# Patient Record
Sex: FEMALE | Race: WHITE | Employment: UNEMPLOYED
[De-identification: names, ages, dates, MRNs, and addresses within clinical notes are randomized per-mention and may not be internally consistent; named-entity substitution may affect disease eponyms.]

---

## 2017-01-10 LAB
BILIRUBIN, URINE: NEGATIVE
BLOOD, URINE: NEGATIVE
CLARITY: ABNORMAL
COLOR: YELLOW
GLUCOSE URINE: ABNORMAL
KETONES, URINE: NEGATIVE
LEUKOCYTE ESTERASE, URINE: NEGATIVE
NITRITE, URINE: NEGATIVE
PH UA: 7.5 (ref 4.5–8)
PROTEIN UA: NEGATIVE
SPECIFIC GRAVITY, URINE: 1.02
UROBILINOGEN, URINE: NORMAL

## 2017-02-03 ENCOUNTER — OFFICE VISIT (OUTPATIENT)
Dept: FAMILY MEDICINE CLINIC | Facility: CLINIC | Age: 47
End: 2017-02-03

## 2017-02-03 VITALS
SYSTOLIC BLOOD PRESSURE: 110 MMHG | HEIGHT: 63 IN | WEIGHT: 150 LBS | HEART RATE: 78 BPM | BODY MASS INDEX: 26.58 KG/M2 | RESPIRATION RATE: 16 BRPM | DIASTOLIC BLOOD PRESSURE: 71 MMHG

## 2017-02-03 DIAGNOSIS — Z13.220 SCREENING CHOLESTEROL LEVEL: ICD-10-CM

## 2017-02-03 DIAGNOSIS — R55 NEAR SYNCOPE: Primary | ICD-10-CM

## 2017-02-03 DIAGNOSIS — R20.2 PARESTHESIA: ICD-10-CM

## 2017-02-03 DIAGNOSIS — F41.9 ANXIETY: ICD-10-CM

## 2017-02-03 DIAGNOSIS — Z23 NEED FOR DTAP VACCINATION: ICD-10-CM

## 2017-02-03 PROCEDURE — G8427 DOCREV CUR MEDS BY ELIG CLIN: HCPCS | Performed by: FAMILY MEDICINE

## 2017-02-03 PROCEDURE — 90471 IMMUNIZATION ADMIN: CPT | Performed by: FAMILY MEDICINE

## 2017-02-03 PROCEDURE — 90715 TDAP VACCINE 7 YRS/> IM: CPT | Performed by: FAMILY MEDICINE

## 2017-02-03 PROCEDURE — G8484 FLU IMMUNIZE NO ADMIN: HCPCS | Performed by: FAMILY MEDICINE

## 2017-02-03 PROCEDURE — G8419 CALC BMI OUT NRM PARAM NOF/U: HCPCS | Performed by: FAMILY MEDICINE

## 2017-02-03 PROCEDURE — 99214 OFFICE O/P EST MOD 30 MIN: CPT | Performed by: FAMILY MEDICINE

## 2017-02-03 PROCEDURE — 1036F TOBACCO NON-USER: CPT | Performed by: FAMILY MEDICINE

## 2017-02-03 RX ORDER — DULOXETIN HYDROCHLORIDE 20 MG/1
20 CAPSULE, DELAYED RELEASE ORAL DAILY
Qty: 30 CAPSULE | Refills: 1 | Status: SHIPPED | OUTPATIENT
Start: 2017-02-03 | End: 2017-02-27 | Stop reason: SDUPTHER

## 2017-02-03 RX ORDER — FLUTICASONE PROPIONATE 50 MCG
1 SPRAY, SUSPENSION (ML) NASAL DAILY
COMMUNITY

## 2017-02-07 LAB
ABSOLUTE BASO #: 0.1 K/UL (ref 0–0.1)
ABSOLUTE EOS #: 0.1 K/UL (ref 0.1–0.4)
ABSOLUTE LYMPH #: 1.5 K/UL (ref 0.8–5.2)
ABSOLUTE MONO #: 0.4 K/UL (ref 0.1–0.9)
ABSOLUTE NEUT #: 4.8 K/UL (ref 1.3–9.1)
ANION GAP SERPL CALCULATED.3IONS-SCNC: 12 MMOL/L
BASOPHILS RELATIVE PERCENT: 1.2 % (ref 0–1)
BUN BLDV-MCNC: 10 MG/DL (ref 10–20)
CALCIUM SERPL-MCNC: 9.6 MG/DL (ref 8.7–10.8)
CHLORIDE BLD-SCNC: 102 MMOL/L (ref 95–111)
CHOLESTEROL/HDL RATIO: 4.5
CHOLESTEROL: 235 MG/DL
CO2: 29 MMOL/L (ref 21–32)
CREAT SERPL-MCNC: 0.7 MG/DL (ref 0.5–1.3)
EGFR AFRICAN AMERICAN: 109
EGFR IF NONAFRICAN AMERICAN: 90
EOSINOPHILS RELATIVE PERCENT: 1.3 % (ref 1–4)
GLUCOSE: 80 MG/DL (ref 70–100)
HCT VFR BLD CALC: 38.7 % (ref 36–48)
HDLC SERPL-MCNC: 52 MG/DL (ref 40–60)
HEMOGLOBIN: 12.6 G/DL (ref 12–16)
LDL CHOLESTEROL CALCULATED: 163 MG/DL
LDL/HDL RATIO: 3.1
LYMPHOCYTE %: 21.3 % (ref 16–48)
MAGNESIUM: 2.1 MG/DL (ref 1.7–2.4)
MCH RBC QN AUTO: 27.9 PG (ref 27–34)
MCHC RBC AUTO-ENTMCNC: 32.6 G/DL (ref 31–36)
MCV RBC AUTO: 85.8 FL (ref 80–100)
MONOCYTES # BLD: 6.3 % (ref 1–8)
NEUTROPHILS RELATIVE PERCENT: 69.6 % (ref 45–75)
PDW BLD-RTO: 12.5 % (ref 10.8–14.8)
PHOSPHORUS: 3.1 MG/DL (ref 2.5–4.7)
PLATELETS: 237 K/UL (ref 150–450)
POTASSIUM SERPL-SCNC: 3.9 MMOL/L (ref 3.5–5.4)
RBC: 4.51 M/UL (ref 4–5.5)
SODIUM BLD-SCNC: 139 MMOL/L (ref 134–147)
TRIGL SERPL-MCNC: 98 MG/DL
VLDLC SERPL CALC-MCNC: 20 MG/DL
WBC: 6.8 K/UL (ref 3.7–10.8)

## 2017-02-24 DIAGNOSIS — R55 NEAR SYNCOPE: ICD-10-CM

## 2017-02-24 LAB
LEFT VENTRICULAR EJECTION FRACTION HIGH VALUE: NORMAL %
LEFT VENTRICULAR EJECTION FRACTION MODE: NORMAL
LV EF: NORMAL %

## 2017-02-27 ENCOUNTER — OFFICE VISIT (OUTPATIENT)
Dept: FAMILY MEDICINE CLINIC | Facility: CLINIC | Age: 47
End: 2017-02-27

## 2017-02-27 VITALS
SYSTOLIC BLOOD PRESSURE: 111 MMHG | BODY MASS INDEX: 27.27 KG/M2 | WEIGHT: 152 LBS | RESPIRATION RATE: 16 BRPM | HEART RATE: 80 BPM | DIASTOLIC BLOOD PRESSURE: 74 MMHG

## 2017-02-27 DIAGNOSIS — E78.00 HIGH CHOLESTEROL: ICD-10-CM

## 2017-02-27 DIAGNOSIS — F41.9 ANXIETY: Primary | ICD-10-CM

## 2017-02-27 DIAGNOSIS — R00.2 PALPITATIONS: ICD-10-CM

## 2017-02-27 PROCEDURE — G8427 DOCREV CUR MEDS BY ELIG CLIN: HCPCS | Performed by: FAMILY MEDICINE

## 2017-02-27 PROCEDURE — 99214 OFFICE O/P EST MOD 30 MIN: CPT | Performed by: FAMILY MEDICINE

## 2017-02-27 PROCEDURE — G8484 FLU IMMUNIZE NO ADMIN: HCPCS | Performed by: FAMILY MEDICINE

## 2017-02-27 PROCEDURE — G8419 CALC BMI OUT NRM PARAM NOF/U: HCPCS | Performed by: FAMILY MEDICINE

## 2017-02-27 PROCEDURE — 1036F TOBACCO NON-USER: CPT | Performed by: FAMILY MEDICINE

## 2017-02-27 RX ORDER — DULOXETINE 40 MG/1
40 CAPSULE, DELAYED RELEASE ORAL DAILY
Qty: 30 CAPSULE | Refills: 2 | Status: SHIPPED
Start: 2017-02-27 | End: 2017-06-05 | Stop reason: DRUGHIGH

## 2017-02-27 RX ORDER — DULOXETINE 40 MG/1
20 CAPSULE, DELAYED RELEASE ORAL DAILY
Qty: 30 CAPSULE | Refills: 2 | Status: SHIPPED | OUTPATIENT
Start: 2017-02-27 | End: 2017-02-27 | Stop reason: DRUGHIGH

## 2017-02-27 ASSESSMENT — PATIENT HEALTH QUESTIONNAIRE - PHQ9
5. POOR APPETITE OR OVEREATING: 0
SUM OF ALL RESPONSES TO PHQ9 QUESTIONS 1 & 2: 2
8. MOVING OR SPEAKING SO SLOWLY THAT OTHER PEOPLE COULD HAVE NOTICED. OR THE OPPOSITE, BEING SO FIGETY OR RESTLESS THAT YOU HAVE BEEN MOVING AROUND A LOT MORE THAN USUAL: 0
7. TROUBLE CONCENTRATING ON THINGS, SUCH AS READING THE NEWSPAPER OR WATCHING TELEVISION: 0
4. FEELING TIRED OR HAVING LITTLE ENERGY: 2
1. LITTLE INTEREST OR PLEASURE IN DOING THINGS: 2
9. THOUGHTS THAT YOU WOULD BE BETTER OFF DEAD, OR OF HURTING YOURSELF: 0
3. TROUBLE FALLING OR STAYING ASLEEP: 0
6. FEELING BAD ABOUT YOURSELF - OR THAT YOU ARE A FAILURE OR HAVE LET YOURSELF OR YOUR FAMILY DOWN: 0
10. IF YOU CHECKED OFF ANY PROBLEMS, HOW DIFFICULT HAVE THESE PROBLEMS MADE IT FOR YOU TO DO YOUR WORK, TAKE CARE OF THINGS AT HOME, OR GET ALONG WITH OTHER PEOPLE: 1
SUM OF ALL RESPONSES TO PHQ QUESTIONS 1-9: 4
2. FEELING DOWN, DEPRESSED OR HOPELESS: 0

## 2017-05-30 ENCOUNTER — TELEPHONE (OUTPATIENT)
Dept: FAMILY MEDICINE CLINIC | Age: 47
End: 2017-05-30

## 2017-06-05 ENCOUNTER — OFFICE VISIT (OUTPATIENT)
Dept: FAMILY MEDICINE CLINIC | Age: 47
End: 2017-06-05
Payer: COMMERCIAL

## 2017-06-05 VITALS
DIASTOLIC BLOOD PRESSURE: 72 MMHG | HEART RATE: 84 BPM | WEIGHT: 158 LBS | RESPIRATION RATE: 16 BRPM | SYSTOLIC BLOOD PRESSURE: 114 MMHG | BODY MASS INDEX: 28.35 KG/M2

## 2017-06-05 DIAGNOSIS — R14.0 BLOATING: ICD-10-CM

## 2017-06-05 DIAGNOSIS — F41.9 ANXIETY: Primary | ICD-10-CM

## 2017-06-05 DIAGNOSIS — J06.9 UPPER RESPIRATORY TRACT INFECTION, UNSPECIFIED TYPE: ICD-10-CM

## 2017-06-05 PROCEDURE — G8427 DOCREV CUR MEDS BY ELIG CLIN: HCPCS | Performed by: FAMILY MEDICINE

## 2017-06-05 PROCEDURE — 99213 OFFICE O/P EST LOW 20 MIN: CPT | Performed by: FAMILY MEDICINE

## 2017-06-05 PROCEDURE — G8419 CALC BMI OUT NRM PARAM NOF/U: HCPCS | Performed by: FAMILY MEDICINE

## 2017-06-05 PROCEDURE — 1036F TOBACCO NON-USER: CPT | Performed by: FAMILY MEDICINE

## 2017-06-05 RX ORDER — DULOXETINE 40 MG/1
40 CAPSULE, DELAYED RELEASE ORAL DAILY
Qty: 30 CAPSULE | Refills: 5 | Status: CANCELLED | OUTPATIENT
Start: 2017-06-05

## 2017-06-05 RX ORDER — DULOXETIN HYDROCHLORIDE 20 MG/1
20 CAPSULE, DELAYED RELEASE ORAL DAILY
Qty: 30 CAPSULE | Refills: 2 | Status: SHIPPED | OUTPATIENT
Start: 2017-06-05 | End: 2017-06-15

## 2017-06-05 RX ORDER — AZITHROMYCIN 250 MG/1
TABLET, FILM COATED ORAL
Qty: 6 TABLET | Refills: 0 | Status: SHIPPED | OUTPATIENT
Start: 2017-06-05 | End: 2017-06-15

## 2017-06-15 DIAGNOSIS — F41.9 ANXIETY: ICD-10-CM

## 2017-06-15 RX ORDER — DULOXETIN HYDROCHLORIDE 20 MG/1
20 CAPSULE, DELAYED RELEASE ORAL 2 TIMES DAILY
Qty: 60 CAPSULE | Refills: 3 | Status: SHIPPED | OUTPATIENT
Start: 2017-06-15 | End: 2018-01-08 | Stop reason: ALTCHOICE

## 2017-09-14 ENCOUNTER — TELEPHONE (OUTPATIENT)
Dept: FAMILY MEDICINE CLINIC | Age: 47
End: 2017-09-14

## 2017-09-14 RX ORDER — AMOXICILLIN AND CLAVULANATE POTASSIUM 875; 125 MG/1; MG/1
1 TABLET, FILM COATED ORAL 2 TIMES DAILY
Qty: 20 TABLET | Refills: 0 | Status: SHIPPED | OUTPATIENT
Start: 2017-09-14 | End: 2017-09-24

## 2017-09-22 ENCOUNTER — TELEPHONE (OUTPATIENT)
Dept: FAMILY MEDICINE CLINIC | Age: 47
End: 2017-09-22

## 2017-09-22 DIAGNOSIS — J32.9 SINUSITIS, UNSPECIFIED CHRONICITY, UNSPECIFIED LOCATION: Primary | ICD-10-CM

## 2018-01-08 ENCOUNTER — OFFICE VISIT (OUTPATIENT)
Dept: FAMILY MEDICINE CLINIC | Age: 48
End: 2018-01-08
Payer: COMMERCIAL

## 2018-01-08 VITALS
HEART RATE: 79 BPM | RESPIRATION RATE: 16 BRPM | SYSTOLIC BLOOD PRESSURE: 126 MMHG | BODY MASS INDEX: 29.59 KG/M2 | WEIGHT: 167 LBS | DIASTOLIC BLOOD PRESSURE: 78 MMHG | HEIGHT: 63 IN

## 2018-01-08 DIAGNOSIS — J32.0 MAXILLARY SINUSITIS, UNSPECIFIED CHRONICITY: ICD-10-CM

## 2018-01-08 DIAGNOSIS — L60.8 CHANGE IN NAIL APPEARANCE: ICD-10-CM

## 2018-01-08 DIAGNOSIS — F32.A DEPRESSION, UNSPECIFIED DEPRESSION TYPE: Primary | ICD-10-CM

## 2018-01-08 DIAGNOSIS — L65.9 THINNING HAIR: ICD-10-CM

## 2018-01-08 LAB — TSH SERPL DL<=0.05 MIU/L-ACNC: 2.08 UIU/ML (ref 0.4–4.4)

## 2018-01-08 PROCEDURE — G8427 DOCREV CUR MEDS BY ELIG CLIN: HCPCS | Performed by: FAMILY MEDICINE

## 2018-01-08 PROCEDURE — G8419 CALC BMI OUT NRM PARAM NOF/U: HCPCS | Performed by: FAMILY MEDICINE

## 2018-01-08 PROCEDURE — 1036F TOBACCO NON-USER: CPT | Performed by: FAMILY MEDICINE

## 2018-01-08 PROCEDURE — G8482 FLU IMMUNIZE ORDER/ADMIN: HCPCS | Performed by: FAMILY MEDICINE

## 2018-01-08 PROCEDURE — 99214 OFFICE O/P EST MOD 30 MIN: CPT | Performed by: FAMILY MEDICINE

## 2018-01-08 RX ORDER — BUPROPION HYDROCHLORIDE 150 MG/1
150 TABLET ORAL EVERY MORNING
Qty: 30 TABLET | Refills: 1 | Status: SHIPPED | OUTPATIENT
Start: 2018-01-08 | End: 2018-03-05 | Stop reason: SDUPTHER

## 2018-01-08 RX ORDER — AMOXICILLIN AND CLAVULANATE POTASSIUM 875; 125 MG/1; MG/1
1 TABLET, FILM COATED ORAL 2 TIMES DAILY
Qty: 20 TABLET | Refills: 0 | Status: SHIPPED | OUTPATIENT
Start: 2018-01-08 | End: 2018-01-18

## 2018-01-08 NOTE — PATIENT INSTRUCTIONS
Patient Education          bupropion  Pronunciation:  byoo PRO pee on  Brand:  Aplenzin, Buproban, Forfivo XL, Wellbutrin SR, Wellbutrin XL, Zyban, Zyban Advantage Pack  What is the most important information I should know about bupropion? You should not take bupropion if you have seizures or an eating disorder, or if you have suddenly stopped using alcohol, seizure medication, or sedatives. If you take Wellbutrin for depression, do not also take Zyban to quit smoking. Do not use bupropion within 14 days before or 14 days after you have used an MAO inhibitor, such as isocarboxazid, linezolid, methylene blue injection, phenelzine, rasagiline, selegiline, or tranylcypromine. Some young people have thoughts about suicide when first taking an antidepressant. Stay alert to changes in your mood or symptoms. Report any new or worsening symptoms to your doctor. What is bupropion? Bupropion is an antidepressant medication used to treat major depressive disorder and seasonal affective disorder. The Zyban brand of bupropion is used to help people stop smoking by reducing cravings and other withdrawal effects. Bupropion may also be used for purposes not listed in this medication guide. What should I discuss with my healthcare provider before taking bupropion? You should not take bupropion if you are allergic to it, or if you have:  · a seizure disorder;  · an eating disorder such as anorexia or bulimia; or  · if you have suddenly stopped using alcohol, seizure medication, or a sedative such as Xanax, Valium, Fiorinal, Klonopin, and others). Do not use an MAO inhibitor within 14 days before or 14 days after you take bupropion. A dangerous drug interaction could occur. MAO inhibitors include isocarboxazid, linezolid, phenelzine, rasagiline, selegiline, and tranylcypromine. Do not take bupropion to treat more than one condition at a time.  If you take bupropion for depression, do not also take this medicine to quit Zyban for 7 weeks. Your doctor may prescribe nicotine patches or gum to help you stop smoking. Do not smoke at any time if you are using a nicotine product along with Zyban. Too much nicotine can cause serious side effects. Read all patient information, medication guides, and instruction sheets provided to you. Ask your doctor or pharmacist if you have any questions. You may have nicotine withdrawal symptoms when you stop smoking, including: increased appetite, weight gain, trouble sleeping, trouble concentrating, slower heart rate, having the urge to smoke, and feeling anxious, restless, depressed, angry, frustrated, or irritated. These symptoms may occur with or without using medication such as Zyban. Smoking cessation may also cause new or worsening mental health problems, such as depression. Bupropion can cause you to have a false positive drug screening test. If you provide a urine sample for drug screening, tell the laboratory staff that you are taking bupropion. Store at room temperature away from moisture, heat, and light. What happens if I miss a dose? Take the missed dose as soon as you remember. Skip the missed dose if it is almost time for your next scheduled dose. Do not  take extra medicine to make up the missed dose. What happens if I overdose? Seek emergency medical attention or call the Poison Help line at 1-530.895.7077. An overdose of bupropion can be fatal.  Overdose symptoms may include muscle stiffness, hallucinations, fast or uneven heartbeat, shallow breathing, or fainting. What should I avoid while taking bupropion? Drinking alcohol with bupropion may increase your risk of seizures. If you drink alcohol regularly, talk with your doctor before changing the amount you drink. Bupropion can also cause seizures in a regular drinker who suddenly stops drinking at the start of treatment with bupropion. Bupropion may impair your thinking or reactions.  Be careful if you drive or do

## 2018-01-08 NOTE — PROGRESS NOTES
Subjective:  Alton Morin is in for continued evaluation and management. Her chronic medical problems include the following; depression, sinus pressure, and thinning hair. She is also complaining of changes in the appearance of her fingernails. She suffers from depression. She was previously on Cymbalta. At this time, she has some difficulty sleeping. She also has some difficulty getting motivated and has been occasionally forgetful. Fortunately, she denies any suicidal or homicidal ideation. I have recommended treatment with bupropion. She is complaining of sinus pressure. She has been using fluticasone nasal twice daily as well as nasal saline. She does have a history of a deviated septum. I have recommended treatment for sinusitis. She indicates that her hair is thinning. She is also concerned about the appearance of her nails. Review of systems per HPI, otherwise negative. Allergies; medications; past medical, surgical, family, and social history; and problem list reviewed as indicated in this encounter. Objective:  Vitals: Blood pressure 126/78, pulse 79, resp. rate 16, height 5' 2.6\" (1.59 m), weight 167 lb (75.8 kg), last menstrual period 01/08/2017. Constitutional: She is oriented to person, place, and time. She appears well-developed and well-nourished and in no acute distress. Cardiovascular: Normal rate and regular rhythm, no murmur, rub, or gallop    Pulmonary/Chest: Effort normal and breath sounds normal. No rales or wheezes. No chest retraction. Extremities: no clubbing, cyanosis, or edema  Neurological:  CN II - XII grossly intact; no focal neurological deficits  Psychiatric:  Well groomed, well dressed. The patient maintains appropriate eye contact and does not appear to be responding to internal stimuli. No agitation  Integument: Thinning hair is noted. Her nails are relatively normal in appearance. There is no pathological pitting nor spooning noted. Assessment/ Plan / Medical Decision Making  1. Depression, unspecified depression type  buPROPion (WELLBUTRIN XL) 150 MG extended release tablet   2. Maxillary sinusitis, unspecified chronicity  amoxicillin-clavulanate (AUGMENTIN) 875-125 MG per tablet   3. Change in nail appearance     4. Thinning hair  TSH without Reflex       Medications, laboratory testing, imaging, consultation, and follow up as documented in this encounter. I have recommended a trial of Wellbutrin. Hopefully, this can help with motivation. Additionally, I have recommended a course of Augmentin for sinusitis. She was reassured that her nails are relatively normal in appearance. I have recommended checking a TSH due to her thinning hair. Follow up in our office in approximately 4 weeks to see how she's doing on the new medication. Mabel Frias received counseling on the following healthy behaviors: medication adherence    Patient given educational materials on bupropion. Was a self-tracking handout given in paper form or via [employer]? No  If yes, see orders or list here. Discussed use, benefit, and side effects of prescribed medications. Barriers to medication compliance addressed. All patient questions answered. Pt voiced understanding. This note is created with the assistance of a speech-recognition program.  While intending to generate a document that actually reflects the content of the visit, no guarantees can be provided that every mistake has been identified and corrected by editing.

## 2018-01-23 ENCOUNTER — TELEPHONE (OUTPATIENT)
Dept: FAMILY MEDICINE CLINIC | Age: 48
End: 2018-01-23

## 2018-01-23 DIAGNOSIS — E03.8 SUBCLINICAL HYPOTHYROIDISM: Primary | ICD-10-CM

## 2018-01-23 RX ORDER — LEVOTHYROXINE SODIUM 0.03 MG/1
25 TABLET ORAL DAILY
Qty: 30 TABLET | Refills: 3 | Status: SHIPPED | OUTPATIENT
Start: 2018-01-23 | End: 2018-06-04

## 2018-01-23 NOTE — TELEPHONE ENCOUNTER
The patient was advised of her TSH results which are indicative of subclinical hypothyroidism. I have recommended low-dose thyroid replacement as well as rechecking a TSH level. The patient voices agreement. Please mail the lab requisition to the patient's home and close the encounter.

## 2018-03-05 DIAGNOSIS — F32.A DEPRESSION, UNSPECIFIED DEPRESSION TYPE: ICD-10-CM

## 2018-03-05 RX ORDER — BUPROPION HYDROCHLORIDE 150 MG/1
150 TABLET ORAL EVERY MORNING
Qty: 90 TABLET | Refills: 1 | Status: SHIPPED | OUTPATIENT
Start: 2018-03-05 | End: 2018-03-06 | Stop reason: SDUPTHER

## 2018-03-06 DIAGNOSIS — F32.A DEPRESSION, UNSPECIFIED DEPRESSION TYPE: ICD-10-CM

## 2018-03-06 RX ORDER — BUPROPION HYDROCHLORIDE 150 MG/1
150 TABLET ORAL EVERY MORNING
Qty: 90 TABLET | Refills: 1 | Status: SHIPPED | OUTPATIENT
Start: 2018-03-06 | End: 2018-06-04

## 2018-04-13 ENCOUNTER — HOSPITAL ENCOUNTER (OUTPATIENT)
Dept: WOMENS IMAGING | Age: 48
Discharge: HOME | End: 2018-04-15
Payer: COMMERCIAL

## 2018-04-13 DIAGNOSIS — Z12.31 SCREENING MAMMOGRAM, ENCOUNTER FOR: ICD-10-CM

## 2018-04-13 PROCEDURE — 77063 BREAST TOMOSYNTHESIS BI: CPT

## 2018-05-29 ENCOUNTER — TELEPHONE (OUTPATIENT)
Dept: PRIMARY CARE CLINIC | Age: 48
End: 2018-05-29

## 2018-05-30 ENCOUNTER — TELEPHONE (OUTPATIENT)
Dept: PRIMARY CARE CLINIC | Age: 48
End: 2018-05-30

## 2018-06-04 ENCOUNTER — OFFICE VISIT (OUTPATIENT)
Dept: PRIMARY CARE CLINIC | Age: 48
End: 2018-06-04
Payer: COMMERCIAL

## 2018-06-04 VITALS
OXYGEN SATURATION: 96 % | HEART RATE: 82 BPM | BODY MASS INDEX: 27.31 KG/M2 | TEMPERATURE: 98.7 F | WEIGHT: 152.2 LBS | RESPIRATION RATE: 16 BRPM | DIASTOLIC BLOOD PRESSURE: 78 MMHG | SYSTOLIC BLOOD PRESSURE: 110 MMHG

## 2018-06-04 DIAGNOSIS — R19.7 DIARRHEA OF PRESUMED INFECTIOUS ORIGIN: Primary | ICD-10-CM

## 2018-06-04 DIAGNOSIS — K52.9 COLITIS: ICD-10-CM

## 2018-06-04 PROCEDURE — G8427 DOCREV CUR MEDS BY ELIG CLIN: HCPCS | Performed by: FAMILY MEDICINE

## 2018-06-04 PROCEDURE — 1036F TOBACCO NON-USER: CPT | Performed by: FAMILY MEDICINE

## 2018-06-04 PROCEDURE — G8417 CALC BMI ABV UP PARAM F/U: HCPCS | Performed by: FAMILY MEDICINE

## 2018-06-04 PROCEDURE — 99213 OFFICE O/P EST LOW 20 MIN: CPT | Performed by: FAMILY MEDICINE

## 2018-06-04 RX ORDER — CIPROFLOXACIN 250 MG/1
500 TABLET, FILM COATED ORAL 2 TIMES DAILY
Qty: 28 TABLET | Refills: 0 | Status: SHIPPED | OUTPATIENT
Start: 2018-06-04 | End: 2018-06-11

## 2018-06-04 RX ORDER — METRONIDAZOLE 500 MG/1
500 TABLET ORAL 3 TIMES DAILY
Qty: 21 TABLET | Refills: 0 | Status: SHIPPED | OUTPATIENT
Start: 2018-06-04 | End: 2018-06-11

## 2018-06-04 ASSESSMENT — PATIENT HEALTH QUESTIONNAIRE - PHQ9
SUM OF ALL RESPONSES TO PHQ QUESTIONS 1-9: 0
1. LITTLE INTEREST OR PLEASURE IN DOING THINGS: 0
2. FEELING DOWN, DEPRESSED OR HOPELESS: 0
SUM OF ALL RESPONSES TO PHQ9 QUESTIONS 1 & 2: 0

## 2018-06-08 LAB
C DIFFICILE TOXIN, EIA: NORMAL
GIARDIA ANTIGEN STOOL: NORMAL

## 2018-06-09 LAB — CULTURE, STOOL: NORMAL

## 2018-06-11 DIAGNOSIS — R19.7 DIARRHEA OF PRESUMED INFECTIOUS ORIGIN: ICD-10-CM

## 2018-07-12 ENCOUNTER — HOSPITAL ENCOUNTER (OUTPATIENT)
Age: 48
Setting detail: SPECIMEN
Discharge: HOME | End: 2018-07-12
Payer: COMMERCIAL

## 2018-07-16 LAB — SURGICAL PATHOLOGY REPORT: NORMAL

## 2018-07-17 PROBLEM — K57.30 DIVERTICULOSIS OF LARGE INTESTINE: Status: ACTIVE | Noted: 2018-07-17

## 2018-08-30 ENCOUNTER — HOSPITAL ENCOUNTER (OUTPATIENT)
Age: 48
Setting detail: SPECIMEN
Discharge: HOME | End: 2018-08-30
Payer: COMMERCIAL

## 2018-09-04 LAB — SURGICAL PATHOLOGY REPORT: NORMAL

## 2018-09-07 RX ORDER — DULOXETIN HYDROCHLORIDE 20 MG/1
20 CAPSULE, DELAYED RELEASE ORAL 2 TIMES DAILY
Qty: 60 CAPSULE | Refills: 3 | Status: SHIPPED | OUTPATIENT
Start: 2018-09-07 | End: 2019-04-19 | Stop reason: SDUPTHER

## 2019-04-20 RX ORDER — DULOXETIN HYDROCHLORIDE 20 MG/1
20 CAPSULE, DELAYED RELEASE ORAL 2 TIMES DAILY
Qty: 180 CAPSULE | Refills: 0 | Status: SHIPPED | OUTPATIENT
Start: 2019-04-20 | End: 2019-05-13 | Stop reason: SDUPTHER

## 2019-05-13 ENCOUNTER — OFFICE VISIT (OUTPATIENT)
Dept: PRIMARY CARE CLINIC | Age: 49
End: 2019-05-13
Payer: COMMERCIAL

## 2019-05-13 VITALS
WEIGHT: 150 LBS | RESPIRATION RATE: 16 BRPM | HEART RATE: 71 BPM | SYSTOLIC BLOOD PRESSURE: 120 MMHG | BODY MASS INDEX: 26.91 KG/M2 | DIASTOLIC BLOOD PRESSURE: 80 MMHG

## 2019-05-13 DIAGNOSIS — M54.2 NECK PAIN: ICD-10-CM

## 2019-05-13 DIAGNOSIS — R25.2 HAND CRAMPS: ICD-10-CM

## 2019-05-13 DIAGNOSIS — E03.8 SUBCLINICAL HYPOTHYROIDISM: ICD-10-CM

## 2019-05-13 DIAGNOSIS — R51.9 NONINTRACTABLE HEADACHE, UNSPECIFIED CHRONICITY PATTERN, UNSPECIFIED HEADACHE TYPE: ICD-10-CM

## 2019-05-13 DIAGNOSIS — K57.30 DIVERTICULOSIS OF LARGE INTESTINE WITHOUT HEMORRHAGE: ICD-10-CM

## 2019-05-13 DIAGNOSIS — F32.A DEPRESSION, UNSPECIFIED DEPRESSION TYPE: Primary | ICD-10-CM

## 2019-05-13 PROCEDURE — 99214 OFFICE O/P EST MOD 30 MIN: CPT | Performed by: FAMILY MEDICINE

## 2019-05-13 PROCEDURE — G8427 DOCREV CUR MEDS BY ELIG CLIN: HCPCS | Performed by: FAMILY MEDICINE

## 2019-05-13 PROCEDURE — G8417 CALC BMI ABV UP PARAM F/U: HCPCS | Performed by: FAMILY MEDICINE

## 2019-05-13 PROCEDURE — 1036F TOBACCO NON-USER: CPT | Performed by: FAMILY MEDICINE

## 2019-05-13 RX ORDER — DULOXETIN HYDROCHLORIDE 30 MG/1
30 CAPSULE, DELAYED RELEASE ORAL DAILY
Qty: 90 CAPSULE | Refills: 1 | Status: SHIPPED | OUTPATIENT
Start: 2019-05-13 | End: 2020-05-21

## 2019-05-13 RX ORDER — DULOXETIN HYDROCHLORIDE 20 MG/1
20 CAPSULE, DELAYED RELEASE ORAL 2 TIMES DAILY
Qty: 180 CAPSULE | Refills: 0 | Status: CANCELLED | OUTPATIENT
Start: 2019-05-13

## 2019-05-13 RX ORDER — BUTALBITAL, ACETAMINOPHEN AND CAFFEINE 50; 325; 40 MG/1; MG/1; MG/1
1 TABLET ORAL EVERY 4 HOURS PRN
Qty: 40 TABLET | Refills: 1 | Status: SHIPPED | OUTPATIENT
Start: 2019-05-13

## 2019-05-13 RX ORDER — DULOXETIN HYDROCHLORIDE 20 MG/1
20 CAPSULE, DELAYED RELEASE ORAL 2 TIMES DAILY
Qty: 180 CAPSULE | Refills: 2 | Status: CANCELLED | OUTPATIENT
Start: 2019-05-13

## 2019-05-13 NOTE — PATIENT INSTRUCTIONS
Patient Education     B Complex for hand cramps     Kegel Exercises: Care Instructions  Your Care Instructions    Kegel exercises strengthen muscles around the bladder. These muscles control the flow of urine. Kegel exercises are sometime called \"pelvic floor\" exercises. They can help prevent urine leakage and keep the pelvic organs in place. A woman who just had a baby might want to try Kegel exercises. They can strengthen pelvic muscles that have been weakened by pregnancy and childbirth. A man or woman may use Kegel exercises to treat urine leakage. You do Kegel exercises by tightening the muscles you use when you urinate. You will likely need to do these exercises for several weeks to get better. Follow-up care is a key part of your treatment and safety. Be sure to make and go to all appointments, and call your doctor if you are having problems. It's also a good idea to know your test results and keep a list of the medicines you take. How can you care for yourself at home? · Do Kegel exercises. ? Find the muscles you need to strengthen. To do this, tighten the muscles that stop your urine while you are going to the bathroom. These are the same muscles you squeeze during Kegel exercises. ? Squeeze the muscles as hard as you can. Your belly and thighs should not move. ? Hold the squeeze for 3 seconds. Then relax for 3 seconds. ? Start with 3 seconds, and then add 1 second each week until you are able to squeeze for 10 seconds. ? Repeat the exercise 10 to 15 times for each session. Do three or more sessions each day. · You can check to see if you are using the right muscles. Place a finger in your vagina and squeeze around it. You are doing them right when you feel pressure around your finger. Your doctor may also suggest that you put special weights in your vagina while you do the exercises. · Do not smoke. It can irritate the bladder.  If you need help quitting, talk to your doctor about stop-smoking programs and medicines. These can increase your chances of quitting for good. Where can you learn more? Go to [email or web address] and sign in to your [employer] account. Enter X163 in the [facility] box to learn more about \"Kegel Exercises: Care Instructions. \"     If you do not have an account, please click on the \"Sign Up Now\" link. Current as of: December 19, 2018  Content Version: 12.0  © 2952-7216 Healthwise, Incorporated. Care instructions adapted under license by [facility] ([facility]). If you have questions about a medical condition or this instruction, always ask your healthcare professional. [address] any warranty or liability for your use of this information.

## 2019-05-13 NOTE — PROGRESS NOTES
Subjective:  Claudia is in for continued evaluation and management. Her chronic medical problems include the following; depression, hypothyroidism, and diverticular disease. She is also complaining of a headache and hand cramps. She suffers from depression. She is currently on duloxetine. She's doing well on the medication, but thinks it may be somewhat less effective. She was previously on 20 mg twice daily. I have recommended adjusting her dose to 30 mg daily. She denies any suicidal or homicidal ideation. She has a history of subclinical hypothyroidism. She denies any symptoms suggestive of hyper or hypothyroidism. Additionally, she has a history of diverticular disease. She denies any left lower quadrant pain or blood in the stool. She is having intermittent headaches. She does have occasional photophobia and phonophobia with this. The headache is typically located above and below her eyes bilaterally. Additionally, she is also complaining of cramping. It is located primarily in her hands. She does complain of neck and back pain. She was evaluated by a chiropractic physician. Unfortunately, treatment in this program would've been cost prohibitive. I have recommended evaluation by Dr. Zhang Nuñez. She does complain of occasional urinary incontinence. This is primarily noted when coughing or sneezing. I have recommended Kegel exercises to start. Review of systems per HPI, otherwise negative. Allergies; medications; past medical, surgical, family, and social history; and problem list reviewed as indicated in this encounter. Objective:  Vitals:  Blood pressure 120/80, pulse 71, resp. rate 16, weight 150 lb (68 kg), not currently breastfeeding. Constitutional: She is oriented to person, place, and time. She appears well-developed and well-nourished and in no acute distress.    Cardiovascular: Normal rate and regular rhythm, no murmur, rub, or gallop    Pulmonary/Chest: Effort normal and breath sounds normal. No rales or wheezes. No chest retraction. Extremities: no clubbing, cyanosis, or edema  Neurological:  CN II - XII grossly intact; no focal neurological deficits  Psychiatric:  Well groomed, well dressed. The patient maintains appropriate eye contact and does not appear to be responding to internal stimuli. No agitation      Assessment/ Plan / Medical Decision Making   Diagnosis Orders   1. Depression, unspecified depression type  DULoxetine (CYMBALTA) 30 MG extended release capsule   2. Subclinical hypothyroidism  TSH without Reflex   3. Diverticulosis of large intestine without hemorrhage     4. Nonintractable headache, unspecified chronicity pattern, unspecified headache type  butalbital-acetaminophen-caffeine (FIORICET, ESGIC) -40 MG per tablet   5. Hand cramps  CBC    Basic Metabolic Panel    Sedimentation rate, automated    CK   6. Neck pain  External Referral To Chiropractic       Medications, laboratory testing, imaging, consultation, and follow up as documented in this encounter. Regarding her depression, increase Cymbalta to 30 mg daily. Recheck TSH. She is asymptomatic with respect to her diverticular disease. Have recommended a trial of Fioricet for headache. If her symptoms persist, or change, neuro imaging may be warranted. Regarding her hand cramps, I have recommended a B complex. Check labs as indicated above. I have referred her to a chiropractor for evaluation of her neck and back pain. Follow up in our office in 3-6 months or as needed. Bandar Ramirez received counseling on the following healthy behaviors: medication adherence    Patient given educational materials on Kegel exercises    Was a self-tracking handout given in paper form or via [employer]? No  If yes, see orders or list here. Discussed use, benefit, and side effects of prescribed medications. Barriers to medication compliance addressed. All patient questions answered.   Pt voiced understanding. This note is created with the assistance of a speech-recognition program.  While intending to generate a document that actually reflects the content of the visit, no guarantees can be provided that every mistake has been identified and corrected by editing.

## 2019-06-12 LAB
ABSOLUTE BASO #: 0.1 K/UL (ref 0–0.1)
ABSOLUTE EOS #: 0.2 K/UL (ref 0.1–0.4)
ABSOLUTE LYMPH #: 1.9 K/UL (ref 0.8–5.2)
ABSOLUTE MONO #: 0.4 K/UL (ref 0.1–0.9)
ABSOLUTE NEUT #: 3.3 K/UL (ref 1.3–9.1)
BASOPHILS RELATIVE PERCENT: 1.2 %
EOSINOPHILS RELATIVE PERCENT: 2.6 %
HCT VFR BLD CALC: 38.4 % (ref 36–48)
HEMOGLOBIN: 13 G/DL (ref 12–16)
LYMPHOCYTE %: 32.2 %
MCH RBC QN AUTO: 29.5 PG (ref 27–34)
MCHC RBC AUTO-ENTMCNC: 33.9 G/DL (ref 31–36)
MCV RBC AUTO: 87.3 FL (ref 80–100)
MONOCYTES # BLD: 6.4 %
NEUTROPHILS RELATIVE PERCENT: 57.3 %
PDW BLD-RTO: 12.8 % (ref 10.8–14.8)
PLATELETS: 209 K/UL (ref 150–450)
RBC: 4.4 M/UL (ref 4–5.5)
SEDIMENTATION RATE, ERYTHROCYTE: 2 MM/HR (ref 0–30)
WBC: 5.8 K/UL (ref 3.7–10.8)

## 2019-06-13 LAB
ANION GAP SERPL CALCULATED.3IONS-SCNC: 8 MMOL/L (ref 4–12)
BUN BLDV-MCNC: 12 MG/DL (ref 5–23)
CALCIUM SERPL-MCNC: 9.1 MG/DL (ref 8.5–10.5)
CHLORIDE BLD-SCNC: 106 MMOL/L (ref 98–109)
CO2: 31 MMOL/L (ref 22–32)
CREAT SERPL-MCNC: 0.85 MG/DL (ref 0.4–1)
EGFR AFRICAN AMERICAN: >60 ML/MIN/1.73SQ.M
EGFR IF NONAFRICAN AMERICAN: >60 ML/MIN/1.73SQ.M
GLUCOSE: 78 MG/DL (ref 65–99)
POTASSIUM SERPL-SCNC: 3.9 MMOL/L (ref 3.5–5)
SODIUM BLD-SCNC: 145 MMOL/L (ref 134–146)
TOTAL CK: 76 U/L (ref 24–170)
TSH SERPL DL<=0.05 MIU/L-ACNC: 1.74 UIU/ML (ref 0.49–4.67)

## 2019-07-08 ENCOUNTER — HOSPITAL ENCOUNTER (OUTPATIENT)
Dept: WOMENS IMAGING | Age: 49
Discharge: HOME | End: 2019-07-10
Payer: COMMERCIAL

## 2019-07-08 DIAGNOSIS — Z12.39 BREAST CANCER SCREENING: ICD-10-CM

## 2019-07-08 PROCEDURE — 77063 BREAST TOMOSYNTHESIS BI: CPT

## 2019-09-06 ENCOUNTER — OFFICE VISIT (OUTPATIENT)
Dept: FAMILY MEDICINE CLINIC | Age: 49
End: 2019-09-06
Payer: COMMERCIAL

## 2019-09-06 VITALS
BODY MASS INDEX: 27.63 KG/M2 | TEMPERATURE: 98.5 F | DIASTOLIC BLOOD PRESSURE: 77 MMHG | WEIGHT: 154 LBS | SYSTOLIC BLOOD PRESSURE: 120 MMHG | OXYGEN SATURATION: 95 % | HEART RATE: 78 BPM

## 2019-09-06 DIAGNOSIS — L25.9 CONTACT DERMATITIS, UNSPECIFIED CONTACT DERMATITIS TYPE, UNSPECIFIED TRIGGER: Primary | ICD-10-CM

## 2019-09-06 PROCEDURE — 1036F TOBACCO NON-USER: CPT | Performed by: NURSE PRACTITIONER

## 2019-09-06 PROCEDURE — 96372 THER/PROPH/DIAG INJ SC/IM: CPT | Performed by: NURSE PRACTITIONER

## 2019-09-06 PROCEDURE — 99202 OFFICE O/P NEW SF 15 MIN: CPT | Performed by: NURSE PRACTITIONER

## 2019-09-06 PROCEDURE — G8417 CALC BMI ABV UP PARAM F/U: HCPCS | Performed by: NURSE PRACTITIONER

## 2019-09-06 PROCEDURE — G8427 DOCREV CUR MEDS BY ELIG CLIN: HCPCS | Performed by: NURSE PRACTITIONER

## 2019-09-06 PROCEDURE — 3017F COLORECTAL CA SCREEN DOC REV: CPT | Performed by: NURSE PRACTITIONER

## 2019-09-06 RX ORDER — HYDROXYZINE HYDROCHLORIDE 25 MG/1
25 TABLET, FILM COATED ORAL EVERY 8 HOURS PRN
Qty: 15 TABLET | Refills: 0 | Status: SHIPPED | OUTPATIENT
Start: 2019-09-06 | End: 2020-05-21 | Stop reason: ALTCHOICE

## 2019-09-06 RX ORDER — PREDNISONE 20 MG/1
TABLET ORAL
Qty: 16 TABLET | Refills: 0 | Status: SHIPPED | OUTPATIENT
Start: 2019-09-06 | End: 2020-05-21 | Stop reason: ALTCHOICE

## 2019-09-06 RX ORDER — METHYLPREDNISOLONE SODIUM SUCCINATE 125 MG/2ML
125 INJECTION, POWDER, LYOPHILIZED, FOR SOLUTION INTRAMUSCULAR; INTRAVENOUS ONCE
Status: COMPLETED | OUTPATIENT
Start: 2019-09-06 | End: 2019-09-06

## 2019-09-06 RX ADMIN — METHYLPREDNISOLONE SODIUM SUCCINATE 125 MG: 125 INJECTION, POWDER, LYOPHILIZED, FOR SOLUTION INTRAMUSCULAR; INTRAVENOUS at 17:33

## 2019-09-06 ASSESSMENT — ENCOUNTER SYMPTOMS
VOMITING: 0
SORE THROAT: 0

## 2020-05-20 ENCOUNTER — TELEPHONE (OUTPATIENT)
Dept: PRIMARY CARE CLINIC | Age: 50
End: 2020-05-20

## 2020-05-20 NOTE — TELEPHONE ENCOUNTER
Pt calls in today because she is dealing with issues that for her arise with stress. Pt used a medication called Axcid in the past for this. Pt reports a sour stomach, nausea & foul smelling diarrhea. In the past if left untreated caused bleeding from the rectum. Pt has seen GI in the past & everything checked out fine. Pt tried to call GI but they are not seeing pts right now. Please advise.

## 2020-05-21 ENCOUNTER — HOSPITAL ENCOUNTER (OUTPATIENT)
Age: 50
Discharge: HOME | End: 2020-05-21
Payer: COMMERCIAL

## 2020-05-21 ENCOUNTER — TELEMEDICINE (OUTPATIENT)
Dept: PRIMARY CARE CLINIC | Age: 50
End: 2020-05-21
Payer: COMMERCIAL

## 2020-05-21 PROCEDURE — 3017F COLORECTAL CA SCREEN DOC REV: CPT | Performed by: INTERNAL MEDICINE

## 2020-05-21 PROCEDURE — U0003 INFECTIOUS AGENT DETECTION BY NUCLEIC ACID (DNA OR RNA); SEVERE ACUTE RESPIRATORY SYNDROME CORONAVIRUS 2 (SARS-COV-2) (CORONAVIRUS DISEASE [COVID-19]), AMPLIFIED PROBE TECHNIQUE, MAKING USE OF HIGH THROUGHPUT TECHNOLOGIES AS DESCRIBED BY CMS-2020-01-R: HCPCS

## 2020-05-21 PROCEDURE — 86769 SARS-COV-2 COVID-19 ANTIBODY: CPT

## 2020-05-21 PROCEDURE — 99214 OFFICE O/P EST MOD 30 MIN: CPT | Performed by: INTERNAL MEDICINE

## 2020-05-21 PROCEDURE — 36415 COLL VENOUS BLD VENIPUNCTURE: CPT

## 2020-05-21 PROCEDURE — G8427 DOCREV CUR MEDS BY ELIG CLIN: HCPCS | Performed by: INTERNAL MEDICINE

## 2020-05-21 RX ORDER — SERTRALINE HYDROCHLORIDE 25 MG/1
25 TABLET, FILM COATED ORAL DAILY
Qty: 30 TABLET | Refills: 3 | Status: SHIPPED | OUTPATIENT
Start: 2020-05-21 | End: 2021-11-24

## 2020-05-21 RX ORDER — PANTOPRAZOLE SODIUM 40 MG/1
40 TABLET, DELAYED RELEASE ORAL DAILY
Qty: 30 TABLET | Refills: 0 | Status: SHIPPED | OUTPATIENT
Start: 2020-05-21 | End: 2020-06-23 | Stop reason: SDUPTHER

## 2020-05-21 RX ORDER — TRAZODONE HYDROCHLORIDE 50 MG/1
50 TABLET ORAL NIGHTLY
Qty: 90 TABLET | Refills: 1 | Status: SHIPPED | OUTPATIENT
Start: 2020-05-21 | End: 2021-11-24

## 2020-05-22 LAB
SARS-COV-2, PCR: NORMAL
SARS-COV-2, RAPID: NORMAL
SARS-COV-2: NOT DETECTED
SOURCE: NORMAL

## 2020-05-23 ENCOUNTER — TELEPHONE (OUTPATIENT)
Dept: PRIMARY CARE CLINIC | Age: 50
End: 2020-05-23

## 2020-05-23 LAB — SARS-COV-2, IGG: NEGATIVE

## 2020-05-25 ASSESSMENT — ENCOUNTER SYMPTOMS
ABDOMINAL PAIN: 0
SINUS PRESSURE: 0
WHEEZING: 0
SINUS PAIN: 0
SHORTNESS OF BREATH: 0
COUGH: 0
VOMITING: 0
BACK PAIN: 0
DIARRHEA: 1
NAUSEA: 1
CONSTIPATION: 0
ABDOMINAL DISTENTION: 0

## 2020-05-26 NOTE — PROGRESS NOTES
[address] [address] [address]  [address] [address]  Dept: [phone]  Dept Fax: [phone]    Sophia Andino is a 47 y.o. female who presents today for a virtual visit for her medical conditions/complaints as noted below. Chief Complaint   Patient presents with    Other     sour stomach, nausea, foul smelling diarrhea        HPI:     Is a 59-year-old female who is here for virtual visit. She is currently exposed to a person who has COVID-19 and she would like to have an antibody testing. She is also anxious and has diarrhea and nausea when she is anxious. Advised and explained in detail how antianxiety medications function and she is open and trying Zoloft. She is also having difficulty in sleeping at night and she has once tried trazodone in the past and it has worked. Advised to take pantoprazole for acid reflux. Answered all her questions. The method of visit - audio and video    Patient consents to perform a telehealth service    The location of the provider - office ; patient during the visit - home   List of all participants- Shamika Hughes MD and Sophia Sina      No results found for: LABA1C          ( goal A1C is < 7)   No results found for: LABMICR  LDL Calculated (mg/dL)   Date Value   02/06/2017 163 (H)       (goal LDL is <100)   BUN (mg/dL)   Date Value   06/12/2019 12     BP Readings from Last 3 Encounters:   09/06/19 120/77   05/13/19 120/80   06/04/18 110/78          (goal 120/80)    Past Medical History:   Diagnosis Date    Dyslipidemia     Insomnia       Past Surgical History:   Procedure Laterality Date    DILATION AND CURETTAGE OF UTERUS  11/29/2017       History reviewed. No pertinent family history.     Social History     Tobacco Use    Smoking status: Never Smoker    Smokeless tobacco: Never Used   Substance Use Topics    Alcohol use: Yes      Current Outpatient Medications   Medication Sig Dispense Refill    pantoprazole (PROTONIX) 40 MG tablet Take 1 tablet by mouth daily 30 tablet 0    sertraline (ZOLOFT) 25 MG tablet Take 1 tablet by mouth daily 30 tablet 3    traZODone (DESYREL) 50 MG tablet Take 1 tablet by mouth nightly 90 tablet 1    Multiple Vitamins-Minerals (MULTIVITAMIN ADULT PO) Take by mouth daily      butalbital-acetaminophen-caffeine (FIORICET, ESGIC) -40 MG per tablet Take 1 tablet by mouth every 4 hours as needed for Headaches 40 tablet 1    fluticasone (FLONASE) 50 MCG/ACT nasal spray 1 spray by Nasal route daily      hydrocortisone 2.5 % cream Apply topically 2 times daily sparingly to rash for 7 days, avoid use on face (Patient not taking: Reported on 5/21/2020) 1 Tube 0     No current facility-administered medications for this visit. No Known Allergies    Health Maintenance   Topic Date Due    Shingles Vaccine (1 of 2) 07/22/2019    TSH testing  06/12/2020    Flu vaccine (Season Ended) 09/01/2020    Breast cancer screen  07/08/2021    Lipid screen  02/06/2022    Cervical cancer screen  05/17/2022    DTaP/Tdap/Td vaccine (2 - Td) 02/03/2027    Colon cancer screen colonoscopy  07/12/2028    HIV screen  Completed    Hepatitis A vaccine  Aged Out    Hepatitis B vaccine  Aged Out    Hib vaccine  Aged Out    Meningococcal (ACWY) vaccine  Aged Out    Pneumococcal 0-64 years Vaccine  Aged Out       Subjective:      Review of Systems   Constitutional: Negative for activity change, appetite change, chills, fatigue and fever. HENT: Negative for congestion, ear pain, hearing loss, nosebleeds, sinus pressure, sinus pain and sneezing. Eyes: Negative for visual disturbance. Respiratory: Negative for cough, shortness of breath and wheezing. Cardiovascular: Negative for chest pain and palpitations. Gastrointestinal: Positive for diarrhea and nausea. Negative for abdominal distention, abdominal pain, constipation and vomiting.    Endocrine: Negative for cold intolerance, heat intolerance, polydipsia, polyphagia and polyuria. Genitourinary: Negative for difficulty urinating, menstrual problem, vaginal bleeding and vaginal discharge. Musculoskeletal: Negative for back pain and joint swelling. Skin: Negative for rash. Neurological: Negative for numbness and headaches. Psychiatric/Behavioral: Positive for sleep disturbance. The patient is nervous/anxious. All other systems reviewed and are negative. Objective:     Physical Exam  Vitals signs and nursing note reviewed. Constitutional:       General: She is not in acute distress. Appearance: Normal appearance. HENT:      Head: Normocephalic and atraumatic. Nose: Nose normal.   Eyes:      General: No scleral icterus. Conjunctiva/sclera: Conjunctivae normal.      Pupils: Pupils are equal, round, and reactive to light. Neck:      Musculoskeletal: Normal range of motion. No neck rigidity. Pulmonary:      Effort: Pulmonary effort is normal. No respiratory distress. Chest:      Chest wall: No tenderness. Abdominal:      General: There is no distension. Musculoskeletal: Normal range of motion. General: No swelling or deformity. Skin:     Coloration: Skin is not jaundiced or pale. Findings: No erythema or rash. Neurological:      General: No focal deficit present. Mental Status: She is alert and oriented to person, place, and time. Mental status is at baseline. Psychiatric:         Mood and Affect: Mood normal.         Behavior: Behavior normal.         Thought Content: Thought content normal.       There were no vitals taken for this visit. Assessment:          1. Other irritable bowel syndrome      2. Gastroesophageal reflux disease, esophagitis presence not specified    - pantoprazole (PROTONIX) 40 MG tablet; Take 1 tablet by mouth daily  Dispense: 30 tablet; Refill: 0    3. Adjustment insomnia    - traZODone (DESYREL) 50 MG tablet;  Take 1 tablet by mouth nightly  Dispense: 90 tablet; Refill: 1    4. Anxiety    - sertraline (ZOLOFT) 25 MG tablet; Take 1 tablet by mouth daily  Dispense: 30 tablet; Refill: 3    5. Close exposure to COVID-19 virus    - Covid-19, Antibody; Future            Diagnosis Orders   1. Other irritable bowel syndrome     2. Gastroesophageal reflux disease, esophagitis presence not specified  pantoprazole (PROTONIX) 40 MG tablet   3. Adjustment insomnia  traZODone (DESYREL) 50 MG tablet   4. Anxiety  sertraline (ZOLOFT) 25 MG tablet   5. Close exposure to COVID-19 virus  Covid-19, Antibody           Plan:      Return for Routine follow-up. Orders Placed This Encounter   Procedures    Covid-19, Antibody     Standing Status:   Future     Number of Occurrences:   1     Standing Expiration Date:   5/21/2021     Orders Placed This Encounter   Medications    pantoprazole (PROTONIX) 40 MG tablet     Sig: Take 1 tablet by mouth daily     Dispense:  30 tablet     Refill:  0    sertraline (ZOLOFT) 25 MG tablet     Sig: Take 1 tablet by mouth daily     Dispense:  30 tablet     Refill:  3    traZODone (DESYREL) 50 MG tablet     Sig: Take 1 tablet by mouth nightly     Dispense:  90 tablet     Refill:  1         Patient given educational materials - see patient instructions. Discussed use, benefit, and side effects of prescribedmedications. All patient questions answered. Pt voiced understanding. Reviewed health maintenance. Instructed to continue current medications, diet and exercise. Patient agreed with treatment plan. Follow up as directed. I spent a total of 25 minutes face to face virtually with this patient. Over 50% of that time was spent on counseling and care coordination. Please see assessment and plan for details. Electronically signed by Mustapha Ridley MD on 5/25/2020 at 8:49 PM      Please note that this chart was generated using voice recognition Dragon dictation software.   Although every effort was made to ensure the accuracy of this automatedtranscription, some errors in transcription may have occurred. Salbador Colbert is a 47 y.o. female being evaluated by a Virtual Visit (video visit) encounter to address concerns as mentioned above. A caregiver was present when appropriate. Due to this being a TeleHealth encounter ([facility]), evaluation of the following organ systems was limited: Vitals/Constitutional/EENT/Resp/CV/GI//MS/Neuro/Skin/Heme-Lymph-Imm. Pursuant to the emergency declaration under the [address] [place] [address], [address] [place] [address] authority and the [employer] and [employer] Act, this Virtual Visit was conducted with patient's (and/or legal guardian's) consent, to reduce the patient's risk of exposure to COVID-19 and provide necessary medical care. The patient (and/or legal guardian) has also been advised to contact this office for worsening conditions or problems, and seek emergency medical treatment and/or call 911 if deemed necessary. Services were provided through a video synchronous discussion virtually to substitute for in-person clinic visit. Patient and provider were located at their individual homes. --Jona Opitz, MD on 5/25/2020 at 8:49 PM    An electronic signature was used to authenticate this note.

## 2020-06-23 RX ORDER — PANTOPRAZOLE SODIUM 40 MG/1
40 TABLET, DELAYED RELEASE ORAL DAILY
Qty: 30 TABLET | Refills: 2 | Status: SHIPPED | OUTPATIENT
Start: 2020-06-23 | Stop reason: SDUPTHER

## 2020-06-23 NOTE — TELEPHONE ENCOUNTER
LV: 5/22/20    Health Maintenance   Topic Date Due    Shingles Vaccine (1 of 2) 07/22/2019    TSH testing  06/12/2020    Flu vaccine (Season Ended) 09/01/2020    Breast cancer screen  07/08/2021    Lipid screen  02/06/2022    Cervical cancer screen  05/17/2022    DTaP/Tdap/Td vaccine (2 - Td) 02/03/2027    Colon cancer screen colonoscopy  07/12/2028    HIV screen  Completed    Hepatitis A vaccine  Aged Out    Hepatitis B vaccine  Aged Out    Hib vaccine  Aged Out    Meningococcal (ACWY) vaccine  Aged Out    Pneumococcal 0-64 years Vaccine  Aged Out             (applicable per patient's age: Cancer Screenings, Depression Screening, Fall Risk Screening, Immunizations)    LDL Calculated (mg/dL)   Date Value   02/06/2017 163 (H)     BUN (mg/dL)   Date Value   06/12/2019 12      (goal A1C is < 7)   (goal LDL is <100) need 30-50% reduction from baseline     BP Readings from Last 3 Encounters:   09/06/19 120/77   05/13/19 120/80   06/04/18 110/78    (goal /80)      All Future Testing planned in [facility]:  Lab Frequency Next Occurrence       Next Visit Date:  No future appointments.          Patient Active Problem List:     Depression     Deviated nasal septum     High cholesterol     Subclinical hypothyroidism     Diverticulosis of large intestine

## 2020-10-14 ENCOUNTER — HOSPITAL ENCOUNTER (OUTPATIENT)
Dept: WOMENS IMAGING | Age: 50
Discharge: HOME | End: 2020-10-16
Payer: COMMERCIAL

## 2020-10-14 PROCEDURE — 77063 BREAST TOMOSYNTHESIS BI: CPT

## 2020-12-18 RX ORDER — PANTOPRAZOLE SODIUM 40 MG/1
TABLET, DELAYED RELEASE ORAL
Qty: 30 TABLET | Refills: 0 | OUTPATIENT
Start: 2020-12-18

## 2020-12-18 RX ORDER — PANTOPRAZOLE SODIUM 40 MG/1
40 TABLET, DELAYED RELEASE ORAL DAILY
Qty: 30 TABLET | Refills: 2 | Status: SHIPPED | OUTPATIENT
Start: 2020-12-18 | End: 2021-11-24

## 2020-12-18 NOTE — TELEPHONE ENCOUNTER
LOV 5/21/20    Health Maintenance   Topic Date Due    Shingles Vaccine (1 of 2) 07/22/2019    TSH testing  06/12/2020    Flu vaccine (1) 09/01/2020    Lipid screen  02/06/2022    Cervical cancer screen  05/17/2022    Breast cancer screen  10/14/2022    DTaP/Tdap/Td vaccine (2 - Td) 02/03/2027    Colon cancer screen colonoscopy  07/12/2028    HIV screen  Completed    Hepatitis A vaccine  Aged Out    Hepatitis B vaccine  Aged Out    Hib vaccine  Aged Out    Meningococcal (ACWY) vaccine  Aged Out    Pneumococcal 0-64 years Vaccine  Aged Out             (applicable per patient's age: Cancer Screenings, Depression Screening, Fall Risk Screening, Immunizations)    LDL Calculated (mg/dL)   Date Value   02/06/2017 163 (H)     BUN (mg/dL)   Date Value   06/12/2019 12      (goal A1C is < 7)   (goal LDL is <100) need 30-50% reduction from baseline     BP Readings from Last 3 Encounters:   09/06/19 120/77   05/13/19 120/80   06/04/18 110/78    (goal /80)      All Future Testing planned in [facility]:  Lab Frequency Next Occurrence       Next Visit Date:  No future appointments.          Patient Active Problem List:     Depression     Deviated nasal septum     High cholesterol     Subclinical hypothyroidism     Diverticulosis of large intestine  \

## 2021-10-20 ENCOUNTER — HOSPITAL ENCOUNTER (OUTPATIENT)
Dept: WOMENS IMAGING | Age: 51
Discharge: HOME | End: 2021-10-22
Payer: COMMERCIAL

## 2021-10-20 DIAGNOSIS — Z12.31 ENCOUNTER FOR SCREENING MAMMOGRAM FOR BREAST CANCER: ICD-10-CM

## 2021-10-20 PROCEDURE — 77063 BREAST TOMOSYNTHESIS BI: CPT

## 2021-11-19 ENCOUNTER — TELEPHONE (OUTPATIENT)
Dept: PRIMARY CARE CLINIC | Age: 51
End: 2021-11-19

## 2021-11-19 NOTE — TELEPHONE ENCOUNTER
Writer spoke with pt and tried to schedule a sooner appt with PCP to further discuss, but due to scheduling issue pt stated that she will just keep her current appt with her PCP.

## 2021-11-19 NOTE — TELEPHONE ENCOUNTER
Scheduled 11/22----- Message from Ave Xiao sent at 11/19/2021  1:50 PM EST -----  Subject: Message to Provider    QUESTIONS  Information for Provider? Dr. Duncan Rodriguez, pt has appt 12/20/21 and would   like to be seen sooner if possible due to some slight concerns about   walking and body pain. Please add pt to cancellation list.  ---------------------------------------------------------------------------  --------------  Daryle Haver INFO  What is the best way for the office to contact you? OK to leave message on   voicemail  Preferred Call Back Phone Number? [phone]  ---------------------------------------------------------------------------  --------------  SCRIPT ANSWERS  Relationship to Patient?  Self

## 2021-11-19 NOTE — TELEPHONE ENCOUNTER
----- Message from Marie Etienne sent at 11/19/2021  1:50 PM EST -----  Subject: Message to Provider    QUESTIONS  Information for Provider? Dr. La Olmedo, pt has appt 12/20/21 and would   like to be seen sooner if possible due to some slight concerns about   walking and body pain. Please add pt to cancellation list.  ---------------------------------------------------------------------------  --------------  Lynn CLEMONS  What is the best way for the office to contact you? OK to leave message on   voicemail  Preferred Call Back Phone Number? [phone]  ---------------------------------------------------------------------------  --------------  SCRIPT ANSWERS  Relationship to Patient?  Self

## 2021-11-22 ENCOUNTER — OFFICE VISIT (OUTPATIENT)
Dept: PRIMARY CARE CLINIC | Age: 51
End: 2021-11-22
Payer: COMMERCIAL

## 2021-11-22 VITALS
BODY MASS INDEX: 27.86 KG/M2 | OXYGEN SATURATION: 98 % | HEIGHT: 62 IN | SYSTOLIC BLOOD PRESSURE: 128 MMHG | HEART RATE: 77 BPM | DIASTOLIC BLOOD PRESSURE: 82 MMHG | WEIGHT: 151.4 LBS

## 2021-11-22 DIAGNOSIS — Z00.00 HEALTHCARE MAINTENANCE: ICD-10-CM

## 2021-11-22 DIAGNOSIS — R22.9 LUMP OF SKIN: ICD-10-CM

## 2021-11-22 DIAGNOSIS — M25.50 ARTHRALGIA, UNSPECIFIED JOINT: ICD-10-CM

## 2021-11-22 DIAGNOSIS — G43.809 OTHER MIGRAINE WITHOUT STATUS MIGRAINOSUS, NOT INTRACTABLE: Primary | ICD-10-CM

## 2021-11-22 DIAGNOSIS — G89.29 CHRONIC PAIN OF LEFT KNEE: ICD-10-CM

## 2021-11-22 DIAGNOSIS — M25.562 CHRONIC PAIN OF LEFT KNEE: ICD-10-CM

## 2021-11-22 DIAGNOSIS — D22.9 CHANGE IN MOLE: ICD-10-CM

## 2021-11-22 DIAGNOSIS — E03.9 HYPOTHYROIDISM, UNSPECIFIED TYPE: ICD-10-CM

## 2021-11-22 DIAGNOSIS — E55.9 VITAMIN D DEFICIENCY: ICD-10-CM

## 2021-11-22 PROCEDURE — 1036F TOBACCO NON-USER: CPT | Performed by: FAMILY MEDICINE

## 2021-11-22 PROCEDURE — G8427 DOCREV CUR MEDS BY ELIG CLIN: HCPCS | Performed by: FAMILY MEDICINE

## 2021-11-22 PROCEDURE — 99214 OFFICE O/P EST MOD 30 MIN: CPT | Performed by: FAMILY MEDICINE

## 2021-11-22 PROCEDURE — G8419 CALC BMI OUT NRM PARAM NOF/U: HCPCS | Performed by: FAMILY MEDICINE

## 2021-11-22 PROCEDURE — 3017F COLORECTAL CA SCREEN DOC REV: CPT | Performed by: FAMILY MEDICINE

## 2021-11-22 PROCEDURE — G8484 FLU IMMUNIZE NO ADMIN: HCPCS | Performed by: FAMILY MEDICINE

## 2021-11-22 RX ORDER — SUMATRIPTAN 50 MG/1
50 TABLET, FILM COATED ORAL
Qty: 9 TABLET | Refills: 5 | Status: SHIPPED | OUTPATIENT
Start: 2021-11-22 | End: 2022-03-09

## 2021-11-22 SDOH — ECONOMIC STABILITY: FOOD INSECURITY: WITHIN THE PAST 12 MONTHS, THE FOOD YOU BOUGHT JUST DIDN'T LAST AND YOU DIDN'T HAVE MONEY TO GET MORE.: NEVER TRUE

## 2021-11-22 SDOH — ECONOMIC STABILITY: TRANSPORTATION INSECURITY
IN THE PAST 12 MONTHS, HAS THE LACK OF TRANSPORTATION KEPT YOU FROM MEDICAL APPOINTMENTS OR FROM GETTING MEDICATIONS?: NO

## 2021-11-22 SDOH — ECONOMIC STABILITY: FOOD INSECURITY: WITHIN THE PAST 12 MONTHS, YOU WORRIED THAT YOUR FOOD WOULD RUN OUT BEFORE YOU GOT MONEY TO BUY MORE.: NEVER TRUE

## 2021-11-22 SDOH — ECONOMIC STABILITY: TRANSPORTATION INSECURITY
IN THE PAST 12 MONTHS, HAS LACK OF TRANSPORTATION KEPT YOU FROM MEETINGS, WORK, OR FROM GETTING THINGS NEEDED FOR DAILY LIVING?: NO

## 2021-11-22 ASSESSMENT — SOCIAL DETERMINANTS OF HEALTH (SDOH): HOW HARD IS IT FOR YOU TO PAY FOR THE VERY BASICS LIKE FOOD, HOUSING, MEDICAL CARE, AND HEATING?: NOT HARD AT ALL

## 2021-11-22 NOTE — PROGRESS NOTES
[address] PRIMARY CARE  [address] [facility] [address]  [address] [address]  Dept: [phone]  Dept Fax: [phone]    Claritza Parkinson is a 45 y.o. female who presents today for her medical conditions/complaints as noted below. Claritza Parkinson is c/o of  Chief Complaint   Patient presents with    Generalized Body Aches     off & on for years    Joint Pain    Migraine     thinks its more sinus related, severe    Other     hand cramping with fine motor activity     HPI:     HPI    Pt seen today to establish care with new pcp. Pt  Has been having joint pains/ body aches for some itme. Feels like she gets popping of her joints, shoulders at times. Sometimes ankle makes popping sound. Does note on occasion will gets tremors in fingers and sometimes fingers get swollen. Does get finger cramping . Gets numbness in her fingers at times at night. Mom has hx of tremors. Pt also gets headaches usually in maxillary or frontal sinus area. Has tried sinus sprays, flonase, vicks. . Tried cool  compress and fiorecet in past. Not much congestion. Notes she gets these headaches every few months. She did see ENT few years ago, states was told had deviated septum. Saw ENT in past- told had deviated septum. Also notes chronic back and knee pain. Left knee has been hurting more lately. Also notes small brown flat spot on leg, sometimes gets darker other times no, I recommend she see a dermatologist for this. Also notes a small know/possible lipoma back of her R upper thigh past 6 months or so. It is not painful.        No results found for: LABA1C          ( goal A1C is < 7)   No results found for: LABMICR  LDL Calculated (mg/dL)   Date Value   2017 163 (H)       (goal LDL is <100)   BUN (mg/dL)   Date Value   2019 12     BP Readings from Last 3 Encounters:   21 128/82   19 120/77   19 120/80          (goal 120/80)    Past Medical History:   Diagnosis Date    Dyslipidemia     Insomnia       Past Surgical History:   Procedure Laterality Date    DILATION AND CURETTAGE OF UTERUS  11/29/2017       Family History   Problem Relation Age of Onset    Breast Cancer Maternal Grandmother        Social History     Tobacco Use    Smoking status: Never Smoker    Smokeless tobacco: Never Used   Substance Use Topics    Alcohol use: Yes      Current Outpatient Medications   Medication Sig Dispense Refill    SUMAtriptan (IMITREX) 50 MG tablet Take 1 tablet by mouth once as needed for Migraine 9 tablet 5    Multiple Vitamins-Minerals (MULTIVITAMIN ADULT PO) Take by mouth daily      hydrocortisone 2.5 % cream Apply topically 2 times daily sparingly to rash for 7 days, avoid use on face 1 Tube 0    butalbital-acetaminophen-caffeine (FIORICET, ESGIC) -40 MG per tablet Take 1 tablet by mouth every 4 hours as needed for Headaches 40 tablet 1    fluticasone (FLONASE) 50 MCG/ACT nasal spray 1 spray by Nasal route daily       No current facility-administered medications for this visit. No Known Allergies    Health Maintenance   Topic Date Due    Hepatitis C screen  Never done    Shingles Vaccine (1 of 2) Never done    TSH testing  06/12/2020    Flu vaccine (1) 09/01/2021    COVID-19 Vaccine (3 - Booster for Pfizer series) 10/07/2021    Lipid screen  02/06/2022    Breast cancer screen  10/20/2023    Cervical cancer screen  01/13/2024    DTaP/Tdap/Td vaccine (2 - Td or Tdap) 02/03/2027    Colon cancer screen colonoscopy  07/12/2028    HIV screen  Completed    Hepatitis A vaccine  Aged Out    Hepatitis B vaccine  Aged Out    Hib vaccine  Aged Out    Meningococcal (ACWY) vaccine  Aged Out    Pneumococcal 0-64 years Vaccine  Aged Out       Subjective:      Review of Systems   Constitutional: Negative for chills and fever. Respiratory: Negative for shortness of breath. Gastrointestinal: Negative for nausea and vomiting. Musculoskeletal: Positive for arthralgias and back pain. Skin:        Dark spot on leg   Neurological: Positive for headaches. Objective:     Physical Exam  Constitutional:       Appearance: She is well-developed. HENT:      Head: Normocephalic and atraumatic. Right Ear: External ear normal.      Left Ear: External ear normal.   Eyes:      Conjunctiva/sclera: Conjunctivae normal.      Pupils: Pupils are equal, round, and reactive to light. Cardiovascular:      Rate and Rhythm: Normal rate and regular rhythm. Heart sounds: Normal heart sounds. Pulmonary:      Effort: Pulmonary effort is normal.      Breath sounds: Normal breath sounds. Musculoskeletal:      Cervical back: Neck supple. Comments: Left knee with normal ROM, no swelling felt on exam. No ttp of joint line currently. Small movable lump back of R thigh, oblong   Skin:     General: Skin is warm and dry. Comments: Very small dark grey/brown appearing spot on left leg. Neurological:      Mental Status: She is alert and oriented to person, place, and time. Psychiatric:         Behavior: Behavior normal.         Thought Content: Thought content normal.       /82   Pulse 77   Ht 5' 2\" (1.575 m)   Wt 151 lb 6.4 oz (68.7 kg)   LMP 04/02/2018   SpO2 98%   BMI 27.69 kg/m²     Assessment:      1. Other migraine without status migrainosus, not intractable  -- recommend trial of imitrex for headaches. - SUMAtriptan (IMITREX) 50 MG tablet; Take 1 tablet by mouth once as needed for Migraine  Dispense: 9 tablet; Refill: 5    2. Arthralgia, unspecified joint  - will check labs. - SABRA Screen With Reflex; Future  - Rheumatoid Factor; Future  - Sedimentation Rate; Future  - C-Reactive Protein; Future  - CBC With Auto Differential; Future    3. Vitamin D deficiency  - Vitamin D 25 Hydroxy; Future    4. Healthcare maintenance  - Comprehensive Metabolic Panel; Future  - Lipid Panel; Future    5.  Hypothyroidism, understanding. Reviewed healthmaintenance. Instructed to continue current medications, diet and exercise. Patient agreed with treatment plan. Follow up as directed.      Electronically signed by Annetta Almanzar DO on 11/24/2021 at 2:02 PM

## 2021-11-24 ENCOUNTER — TELEPHONE (OUTPATIENT)
Dept: PRIMARY CARE CLINIC | Age: 51
End: 2021-11-24

## 2021-11-24 ASSESSMENT — ENCOUNTER SYMPTOMS
NAUSEA: 0
BACK PAIN: 1
SHORTNESS OF BREATH: 0
VOMITING: 0

## 2021-11-24 NOTE — LETTER
[address], [address] [place] [address] Way  [facility], [facility].  [phone]      11/24/2021      Ashok Cyr [address]  [address] [place]  [address]      Dear Rosendo Grimes      Due to an unforseen conflict, Gabi Louis, DO will not be in the office on the day of your scheduled appointment 12/20/2021 . It is neccessary to cancel your your appointment. Please call our office as soon as possible so that we may re-schedule your appointment. We recognize that everyone's time is valuable and we sincerely apologize for the inconvenience.         Thank You for your understanding      [facility]

## 2021-11-29 LAB
ABSOLUTE BASO #: 0 X10E9/L (ref 0–0.2)
ABSOLUTE EOS #: 0.1 X10E9/L (ref 0–0.4)
ABSOLUTE LYMPH #: 1.6 X10E9/L (ref 1–3.5)
ABSOLUTE MONO #: 0.3 X10E9/L (ref 0–0.9)
ABSOLUTE NEUT #: 3 X10E9/L (ref 1.5–6.6)
ALBUMIN SERPL-MCNC: 4.5 G/DL (ref 3.2–5.3)
ALK PHOSPHATASE: 91 U/L (ref 39–130)
ALT SERPL-CCNC: 25 U/L (ref 0–31)
ANION GAP SERPL CALCULATED.3IONS-SCNC: 9 MMOL/L (ref 5–15)
ANTI-NUCLEAR ANTIBODY (ANA): NEGATIVE
AST SERPL-CCNC: 22 U/L (ref 0–41)
BASOPHILS RELATIVE PERCENT: 0.9 %
BILIRUB SERPL-MCNC: 0.4 MG/DL (ref 0.3–1.2)
BUN BLDV-MCNC: 12 MG/DL (ref 5–23)
C-REACTIVE PROTEIN: 0.3 MG/DL (ref 0–0.74)
CALCIUM SERPL-MCNC: 9.4 MG/DL (ref 8.5–10.5)
CHLORIDE BLD-SCNC: 105 MMOL/L (ref 98–109)
CHOLESTEROL/HDL RATIO: 3.9 (ref 1–5)
CHOLESTEROL: 236 MG/DL (ref 150–200)
CO2: 30 MMOL/L (ref 22–32)
CREAT SERPL-MCNC: 0.7 MG/DL (ref 0.4–1)
EGFR AFRICAN AMERICAN: >60 ML/MIN/1.73SQ.M
EGFR IF NONAFRICAN AMERICAN: >60 ML/MIN/1.73SQ.M
EOSINOPHILS RELATIVE PERCENT: 2.9 %
GLUCOSE: 81 MG/DL (ref 65–99)
HCT VFR BLD CALC: 42.1 % (ref 35–47)
HDLC SERPL-MCNC: 61 MG/DL
HEMOGLOBIN: 13.8 G/DL (ref 11.7–15.5)
LDL CHOLESTEROL CALCULATED: 145 MG/DL
LDL/HDL RATIO: 2.4
LYMPHOCYTE %: 31.6 %
MCH RBC QN AUTO: 28.7 PG (ref 27–34)
MCHC RBC AUTO-ENTMCNC: 32.8 G/DL (ref 32–36)
MCV RBC AUTO: 88 FL (ref 80–100)
MONOCYTES # BLD: 6.1 %
NEUTROPHILS RELATIVE PERCENT: 58.5 %
PDW BLD-RTO: 13.2 % (ref 11.5–15)
PLATELETS: 181 X10E9/L (ref 150–450)
PMV BLD AUTO: 10.3 FL (ref 7–12)
POTASSIUM SERPL-SCNC: 3.8 MMOL/L (ref 3.5–5)
RBC: 4.81 X10E12/L (ref 3.8–5.2)
RHEUMATOID FACTOR: <10 IU/ML
SEDIMENTATION RATE, ERYTHROCYTE: 15 MM/H (ref 0–30)
SODIUM BLD-SCNC: 144 MMOL/L (ref 134–146)
TOTAL PROTEIN: 7.1 G/DL (ref 6–8)
TRIGL SERPL-MCNC: 151 MG/DL (ref 27–150)
TSH SERPL DL<=0.05 MIU/L-ACNC: 1.32 UIU/ML (ref 0.49–4.67)
VITAMIN D 25-HYDROXY: 19.8 NG/ML (ref 30–100)
VLDLC SERPL CALC-MCNC: 30 MG/DL (ref 0–30)
WBC: 5.1 X10E9/L (ref 4–11)

## 2021-12-02 DIAGNOSIS — E55.9 VITAMIN D DEFICIENCY: Primary | ICD-10-CM

## 2021-12-02 RX ORDER — ERGOCALCIFEROL 1.25 MG/1
50000 CAPSULE ORAL WEEKLY
Qty: 12 CAPSULE | Refills: 1 | Status: SHIPPED | OUTPATIENT
Start: 2021-12-02

## 2021-12-03 NOTE — RESULT ENCOUNTER NOTE
Please let pt know vitamin d is low, I will send vitamin d supplement she can take once a week for 3 months to help with this. Her cholesterol is elevated, so I recommend cutting back on fatty foods, and staying active.  Other labs ordered Tsh, inflammatory markers and other labs were all normal. She is not anemic and kidney function and liver enzymes normal.

## 2021-12-20 ENCOUNTER — HOSPITAL ENCOUNTER (OUTPATIENT)
Age: 51
Discharge: HOME | End: 2021-12-22
Payer: COMMERCIAL

## 2021-12-20 ENCOUNTER — HOSPITAL ENCOUNTER (OUTPATIENT)
Dept: GENERAL RADIOLOGY | Age: 51
Discharge: HOME | End: 2021-12-22
Payer: COMMERCIAL

## 2021-12-20 DIAGNOSIS — M25.562 CHRONIC PAIN OF LEFT KNEE: ICD-10-CM

## 2021-12-20 DIAGNOSIS — G89.29 CHRONIC PAIN OF LEFT KNEE: ICD-10-CM

## 2021-12-20 PROCEDURE — 73562 X-RAY EXAM OF KNEE 3: CPT

## 2021-12-21 ENCOUNTER — OFFICE VISIT (OUTPATIENT)
Dept: PRIMARY CARE CLINIC | Age: 51
End: 2021-12-21
Payer: COMMERCIAL

## 2021-12-21 VITALS
DIASTOLIC BLOOD PRESSURE: 82 MMHG | BODY MASS INDEX: 27.55 KG/M2 | WEIGHT: 150.6 LBS | SYSTOLIC BLOOD PRESSURE: 124 MMHG | HEART RATE: 84 BPM | OXYGEN SATURATION: 98 %

## 2021-12-21 DIAGNOSIS — Z00.00 HEALTHCARE MAINTENANCE: Primary | ICD-10-CM

## 2021-12-21 DIAGNOSIS — M25.541 ARTHRALGIA OF RIGHT HAND: ICD-10-CM

## 2021-12-21 DIAGNOSIS — G43.809 OTHER MIGRAINE WITHOUT STATUS MIGRAINOSUS, NOT INTRACTABLE: ICD-10-CM

## 2021-12-21 DIAGNOSIS — M25.50 ARTHRALGIA, UNSPECIFIED JOINT: ICD-10-CM

## 2021-12-21 DIAGNOSIS — M25.562 CHRONIC PAIN OF LEFT KNEE: ICD-10-CM

## 2021-12-21 DIAGNOSIS — M25.551 BILATERAL HIP PAIN: ICD-10-CM

## 2021-12-21 DIAGNOSIS — R41.3 MEMORY LOSS: ICD-10-CM

## 2021-12-21 DIAGNOSIS — G89.29 CHRONIC PAIN OF LEFT KNEE: ICD-10-CM

## 2021-12-21 DIAGNOSIS — M25.552 BILATERAL HIP PAIN: ICD-10-CM

## 2021-12-21 PROCEDURE — 99396 PREV VISIT EST AGE 40-64: CPT | Performed by: FAMILY MEDICINE

## 2021-12-21 PROCEDURE — G8484 FLU IMMUNIZE NO ADMIN: HCPCS | Performed by: FAMILY MEDICINE

## 2021-12-21 ASSESSMENT — ENCOUNTER SYMPTOMS
NAUSEA: 0
VOMITING: 0
SHORTNESS OF BREATH: 0

## 2021-12-21 NOTE — PROGRESS NOTES
[address] PRIMARY CARE  [address]  [address] [address]  Dept: [phone]  Dept Fax: [phone]    Chele Alba is a 45 y.o. female who presents today for her medical conditions/complaints as noted below. Chele Alba is c/o of  Chief Complaint   Patient presents with    Annual Exam     Physical    Discuss Labs     xray results         HPI:     HPI     Pt here today for yearly physical  Labs reviewed    Xray of knee showed small effusion and mild enthesopathy . She does note that going up and down stairs makes it worse. Feels joints popping in  Shoulder at times. Also notes pain in her hands as well and sometimes notes mild swelling and joint pain. She also has chronic bilateral hip pain as well. She has not seen any specialist in the past for it. Reviewed labs that we ordered sed rate and SABRA and RF and CRP were normal.  I do however recommend seeing a rheumatologist for follow-up regarding the symptoms. Biofreeze does help her left knee at times. She also notes migraines for which the Imitrex did seem to help somewhat. Patient states that she is more forgetful than she used to be. States she has noticed a gradual decline over the past 10 years. Sometimes driving places that she usually knows and forgets how to get there. Sometimes has difficulty trying to find words for something she wants to say and unable to. States for the most part sleeps well.         No results found for: LABA1C          ( goal A1C is < 7)   No results found for: LABMICR  LDL Calculated (mg/dL)   Date Value   11/29/2021 145 (H)   02/06/2017 163 (H)       (goal LDL is <100)   AST (U/L)   Date Value   11/29/2021 22     ALT (U/L)   Date Value   11/29/2021 25     BUN (mg/dL)   Date Value   11/29/2021 12     BP Readings from Last 3 Encounters:   12/21/21 124/82   11/22/21 128/82   09/06/19 120/77          (goal 120/80)    Past Medical History:   Diagnosis Date    Dyslipidemia     Insomnia       Past Surgical History:   Procedure Laterality Date    DILATION AND CURETTAGE OF UTERUS  11/29/2017       Family History   Problem Relation Age of Onset    Breast Cancer Maternal Grandmother        Social History     Tobacco Use    Smoking status: Never Smoker    Smokeless tobacco: Never Used   Substance Use Topics    Alcohol use: Yes      Current Outpatient Medications   Medication Sig Dispense Refill    vitamin D (ERGOCALCIFEROL) 1.25 MG (62006 UT) CAPS capsule Take 1 capsule by mouth once a week 12 capsule 1    SUMAtriptan (IMITREX) 50 MG tablet Take 1 tablet by mouth once as needed for Migraine 9 tablet 5    Multiple Vitamins-Minerals (MULTIVITAMIN ADULT PO) Take by mouth daily      hydrocortisone 2.5 % cream Apply topically 2 times daily sparingly to rash for 7 days, avoid use on face 1 Tube 0    butalbital-acetaminophen-caffeine (FIORICET, ESGIC) -40 MG per tablet Take 1 tablet by mouth every 4 hours as needed for Headaches 40 tablet 1    fluticasone (FLONASE) 50 MCG/ACT nasal spray 1 spray by Nasal route daily       No current facility-administered medications for this visit. No Known Allergies    Health Maintenance   Topic Date Due    Hepatitis C screen  Never done    Shingles Vaccine (1 of 2) Never done    Flu vaccine (1) 09/01/2021    TSH testing  11/29/2022    Breast cancer screen  10/20/2023    Cervical cancer screen  01/13/2024    Lipid screen  11/29/2026    DTaP/Tdap/Td vaccine (2 - Td or Tdap) 02/03/2027    Colon cancer screen colonoscopy  07/12/2028    COVID-19 Vaccine  Completed    HIV screen  Completed    Hepatitis A vaccine  Aged Out    Hepatitis B vaccine  Aged Out    Hib vaccine  Aged Out    Meningococcal (ACWY) vaccine  Aged Out    Pneumococcal 0-64 years Vaccine  Aged Out       Subjective:      Review of Systems   Constitutional: Negative for chills and fever. Respiratory: Negative for shortness of breath. neurology. - Amari Gagnon MD, Neurology, [place]    7. Other migraine without status migrainosus, not intractable  -Continue Imitrex as needed. - Amari Gagnon MD, Neurology, [place]           Plan:      Return in about 3 months (around 3/21/2022) for follow up. Orders Placed This Encounter   Procedures   Donavon Long MD, Rheumatology, [facility]     Referral Priority:   Routine     Referral Type:   Eval and Treat     Referral Reason:   Specialty Services Required     Referred to Provider:   Rahul Garcia MD     Requested Specialty:   Rheumatology     Number of Visits Requested:   Reece Hayward MD, Neurology, [place]     Referral Priority:   Routine     Referral Type:   Eval and Treat     Referral Reason:   Specialty Services Required     Referred to Provider:   Moody Evans MD     Requested Specialty:   Neurology     Number of Visits Requested:   1     No orders of the defined types were placed in this encounter. Patient given educational materials - see patient instructions. Discussed use, benefit, and side effects of prescribed medications. All patient questions answered. Pt voiced understanding. Reviewed healthmaintenance. Instructed to continue current medications, diet and exercise. Patient agreed with treatment plan. Follow up as directed.      Electronically signed by Poli Cruz DO on 12/21/2021 at 1:15 PM

## 2022-03-09 ENCOUNTER — OFFICE VISIT (OUTPATIENT)
Dept: NEUROLOGY | Age: 52
End: 2022-03-09
Payer: COMMERCIAL

## 2022-03-09 VITALS
WEIGHT: 159 LBS | HEIGHT: 61 IN | SYSTOLIC BLOOD PRESSURE: 116 MMHG | DIASTOLIC BLOOD PRESSURE: 74 MMHG | BODY MASS INDEX: 30.02 KG/M2 | HEART RATE: 73 BPM

## 2022-03-09 DIAGNOSIS — G56.03 BILATERAL CARPAL TUNNEL SYNDROME: ICD-10-CM

## 2022-03-09 DIAGNOSIS — R41.3 MEMORY LOSS: Primary | ICD-10-CM

## 2022-03-09 DIAGNOSIS — R06.83 SNORING: ICD-10-CM

## 2022-03-09 DIAGNOSIS — G43.709 CHRONIC MIGRAINE WITHOUT AURA WITHOUT STATUS MIGRAINOSUS, NOT INTRACTABLE: ICD-10-CM

## 2022-03-09 PROCEDURE — 3017F COLORECTAL CA SCREEN DOC REV: CPT | Performed by: STUDENT IN AN ORGANIZED HEALTH CARE EDUCATION/TRAINING PROGRAM

## 2022-03-09 PROCEDURE — G8427 DOCREV CUR MEDS BY ELIG CLIN: HCPCS | Performed by: STUDENT IN AN ORGANIZED HEALTH CARE EDUCATION/TRAINING PROGRAM

## 2022-03-09 PROCEDURE — G8417 CALC BMI ABV UP PARAM F/U: HCPCS | Performed by: STUDENT IN AN ORGANIZED HEALTH CARE EDUCATION/TRAINING PROGRAM

## 2022-03-09 PROCEDURE — 99204 OFFICE O/P NEW MOD 45 MIN: CPT | Performed by: STUDENT IN AN ORGANIZED HEALTH CARE EDUCATION/TRAINING PROGRAM

## 2022-03-09 PROCEDURE — G8484 FLU IMMUNIZE NO ADMIN: HCPCS | Performed by: STUDENT IN AN ORGANIZED HEALTH CARE EDUCATION/TRAINING PROGRAM

## 2022-03-09 PROCEDURE — 1036F TOBACCO NON-USER: CPT | Performed by: STUDENT IN AN ORGANIZED HEALTH CARE EDUCATION/TRAINING PROGRAM

## 2022-03-09 RX ORDER — PANTOPRAZOLE SODIUM 40 MG/1
40 TABLET, DELAYED RELEASE ORAL DAILY
COMMUNITY

## 2022-03-09 RX ORDER — LANOLIN ALCOHOL/MO/W.PET/CERES
400 CREAM (GRAM) TOPICAL DAILY
Qty: 30 TABLET | Refills: 3 | Status: SHIPPED | OUTPATIENT
Start: 2022-03-09

## 2022-03-09 ASSESSMENT — ENCOUNTER SYMPTOMS
EYES NEGATIVE: 1
GASTROINTESTINAL NEGATIVE: 1
RESPIRATORY NEGATIVE: 1

## 2022-03-09 NOTE — PROGRESS NOTES
[facility] NEUROLOGY SPECIALIST  [address]  Dept: [phone]    PATIENT NAME: Cierra Wagner  PATIENT MRN: [id number]  PRIMARY CARE PHYSICIAN: Dania Mcclellan DO    HPI:      Cierra Wagner is a 45 y.o. female who presents to clinic today for evaluation of Headache, Memory Loss, Numbness (hands), and Spasms (hands)       Memory:She started noticing issues with memory for the past ten years. She has difficulty with word finding and getting lost in familiar places. She also notes that she can't remember family or friends names. No issues with ADLs. She is managing her finances. She is not currently working. 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 MMSE 30/30 in clinic today. Labs reviewed including CRP, ESR, rheumatoid factor,  SABRA panel and TSH were all normal.  Patient's vitamin D was low and she is now  on high-dose supplementation. .    Paresthesias: Since, July 2018, she has been having cramping and tingling of her hands L>R. The pain wakes her up at night. She was wearing wrist splints which were effective initially and but are not effective anymore. She tries not to open jars and this can be difficult. Denies any weakness in her hands. Headaches:  Patient also reports headaches that started about ten years. She notes she has constant pain in her sinuses. The headaches are not preceded by any aura. Currently the headaches are occurring 2-3 times a week. Associated symptoms include  nausea/vomiting, photophobia, phonophobia and change in speech. Denies any other symptoms. Headaches are typically triggered by chlorine smell, wine. Headaches are relieved by acetaminophen/aspirin/caffeine. Sleep: snore, no apneic episodes. She has difficulty falling sleep, has fragmented sleep because her  is sleeping.  She wakes up at 6:30 am-7:00 am. She goes to bed at 1 am.       She went to ENT for evaluation of sinuses and was told she had a DNS.  She was recommended a procedure but noted that this may or may not improve her headaches. Headache Medications  Current abortive meds:sumatriptan  Current prophylactic meds:none  Previous abortive medications tried:none  Previous prophylactic medications tried:            PREVIOUS WORKUP:     Lab Results   Component Value Date    WBC 5.1 11/29/2021    HGB 13.8 11/29/2021    HCT 42.1 11/29/2021    MCV 88 11/29/2021     11/29/2021       Past Medical History:   Diagnosis Date    Acid reflux     Anemia     Dyslipidemia     Insomnia     Migraine     Tremor         Past Surgical History:   Procedure Laterality Date    CERVICAL POLYP REMOVAL  11/2017    DILATION AND CURETTAGE OF UTERUS  11/29/2017        Social History     Socioeconomic History    Marital status:      Spouse name: Not on file    Number of children: Not on file    Years of education: Not on file    Highest education level: Not on file   Occupational History    Not on file   Tobacco Use    Smoking status: Never Smoker    Smokeless tobacco: Never Used   Vaping Use    Vaping Use: Never used   Substance and Sexual Activity    Alcohol use: Yes    Drug use: No    Sexual activity: Yes     Partners: Male   Other Topics Concern    Not on file   Social History Narrative    Not on file     Social Determinants of Health     Financial Resource Strain: Low Risk     Difficulty of Paying Living Expenses: Not hard at all   Food Insecurity: No Food Insecurity    Worried About Running Out of Food in the Last Year: Never true    [address] in the Last Year: Never true   Transportation Needs: No Transportation Needs    Lack of Transportation (Medical): No    Lack of Transportation (Non-Medical):  No   Physical Activity:     Days of Exercise per Week: Not on file    Minutes of Exercise per Session: Not on file   Stress:     Feeling of Stress : Not on file   Social Connections:     Frequency of Communication with Friends and Family: Not on file    Frequency of Social Gatherings with Friends and Family: Not on file    Attends Religion Services: Not on file    Active Member of Clubs or Organizations: Not on file    Attends Club or Organization Meetings: Not on file    Marital Status: Not on file   Intimate Partner Violence:     Fear of Current or Ex-Partner: Not on file    Emotionally Abused: Not on file    Physically Abused: Not on file    Sexually Abused: Not on file   Housing Stability:     Unable to Pay for Housing in the Last Year: Not on file    Number of [place] in the Last Year: Not on file    Unstable Housing in the Last Year: Not on file        Current Outpatient Medications   Medication Sig Dispense Refill    pantoprazole (PROTONIX) 40 MG tablet Take 40 mg by mouth daily      magnesium oxide (MAG-OX) 400 (240 Mg) MG tablet Take 1 tablet by mouth daily 30 tablet 3    vitamin D (ERGOCALCIFEROL) 1.25 MG (78681 UT) CAPS capsule Take 1 capsule by mouth once a week 12 capsule 1    SUMAtriptan (IMITREX) 50 MG tablet Take 1 tablet by mouth once as needed for Migraine 9 tablet 5    butalbital-acetaminophen-caffeine (FIORICET, ESGIC) -40 MG per tablet Take 1 tablet by mouth every 4 hours as needed for Headaches 40 tablet 1    fluticasone (FLONASE) 50 MCG/ACT nasal spray 1 spray by Nasal route daily      Multiple Vitamins-Minerals (MULTIVITAMIN ADULT PO) Take by mouth daily (Patient not taking: Reported on 3/9/2022)      hydrocortisone 2.5 % cream Apply topically 2 times daily sparingly to rash for 7 days, avoid use on face (Patient not taking: Reported on 3/9/2022) 1 Tube 0     No current facility-administered medications for this visit. No Known Allergies     REVIEW OF SYSTEMS:     Review of Systems   Constitutional: Negative. HENT: Negative. Eyes: Negative. Respiratory: Negative. Cardiovascular: Negative. Gastrointestinal: Negative. Endocrine: Negative.     Genitourinary: Negative. Musculoskeletal: Negative. Skin: Negative. Neurological: Positive for headaches. Negative for dizziness, tremors, seizures, syncope, facial asymmetry, speech difficulty, weakness, light-headedness and numbness. Hematological: Negative. Psychiatric/Behavioral: Positive for sleep disturbance. VITALS  /74 (Site: Left Upper Arm, Position: Sitting, Cuff Size: Medium Adult)   Pulse 73   Ht 5' 1\" (1.549 m)   Wt 159 lb (72.1 kg)   LMP 04/02/2018   BMI 30.04 kg/m²        NEUROLOGICAL EXAMINATION:         Mental status    Alert and oriented x 3; intact memory with no confusion, speech or language problems; no hallucinations or delusions  Fund of information appropriate for level of education    Cranial nerves    II - visual fields intact to confrontation  III, IV, VI - extra-ocular muscles full: no pupillary defect; no AJIT, no nystagmus, no ptosis   V - normal facial sensation                                                               VII - normal facial symmetry                                                             VIII - intact hearing                                                                             IX, X - symmetrical palate                                                                  XI - symmetrical shoulder shrug                                                       XII - tongue midline without atrophy or fasciculation      Motor function  Normal muscle bulk and tone; strength 5/5 on all 4 extremities, no pronator drift      Sensory function Intact to light touch, pinprick, vibration, proprioception on all 4 extremities      Cerebellar Intact fine motor movement. No involuntary movements or tremors. No ataxia or dysmetria on finger to nose or heel to shin testing      Reflex function DTR 2+ on bilateral UE and LE, symmetric. Negative Babinski      Gait                   normal base and arm swing      MINI-MENTAL STATUS EXAM (Dora Dupree)    What is the Year? Season? Date? Day? Month?   (indicate # correct)        5    Where are we: State? County? Town? Place? Floor? (indicate # correct)        5    Name 3 objects and have pt repeat.                (indicate # correct)        3    Serial 7's or spell \"world\" backwards.                 (indicate # correct)        1    Recall 3 objects                (indicate # correct)        3    Patient names a pencil & a watch                (indicate # correct)        2    Read and obey \"Close your eyes\"                (indicate 1 if correct)     1    Copy intersecting pentagons                (indicate 1 if correct)     1    Write a sentence                (indicate 1 if correct)     1    Repeat \"no ifs, ands, or buts\"                (indicate 1 if correct)     1    Follow 3-stage command                (indicate # done correctly) 1                                            ------------  TOTAL SCORE   (max = 30)                  30      REFERENCE INFORMATION FOR THE CLINICIAN:    \"Low\" score    = 0-23  \"Normal\" score = 24-30  mini-mental status exam  ASSESSMENT / PLAN:   This is a 50-year-old female who presents for evaluation of headaches, memory loss and paresthesias in her bilateral hands. Discussed with patient that her memory loss is multifactorial in etiology as its likely caused by her poor sleep, possibly underlying mood issues. Mini-Mental exam was normal today. Patient scored a 30 out of 30. Discussed we can proceed with an MRI for further evaluation to assess if there is any structural causes for patient's memory deficits. We will also proceed with a sleep study to assess for any underlying sleep breathing disorder which can also contribute to cognitive impairment. Given her headaches are infrequent we will start magnesium oxide for migraine prophylaxis and continue sumatriptan for abortive therapy. We will also proceed with doing a vitamin B12 level to assess for reversible cause of dementia.   Thyroid level was recently checked which was normal.  Regarding patient's paresthesias there is concern for carpal tunnel. Discussed we will proceed with an EMG for further evaluation I would recommend wearing wrist splints for 3 months to see if her symptoms improve. 1. Memory loss  -     MRI BRAIN W WO CONTRAST; Future  -     Vitamin B12 & Folate; Future  -     Methylmalonic Acid, Serum; Future  -     Baseline Diagnostic Sleep Study; Future  2. Chronic migraine without aura without status migrainosus, not intractable  -     magnesium oxide (MAG-OX) 400 (240 Mg) MG tablet; Take 1 tablet by mouth daily, Disp-30 tablet, R-3Normal  -     MRI BRAIN W WO CONTRAST; Future  3. Bilateral carpal tunnel syndrome  -     EMG; Future  4. Snoring  -     Baseline Diagnostic Sleep Study;  Future    Terrance Bryson MD   Neurology & Sleep Medicine  [facility].

## 2022-03-21 ENCOUNTER — TELEPHONE (OUTPATIENT)
Dept: PRIMARY CARE CLINIC | Age: 52
End: 2022-03-21

## 2022-03-21 NOTE — TELEPHONE ENCOUNTER
Made contact with patient, states that she didn't know she had appt. Advised that I left her a voicemail the Friday prior, on the same number I was calling her on. Patient states that she wouldn't be able to reschedule at this moment. Will send letter.

## 2022-03-21 NOTE — LETTER
[facility]  [address]  Phone: [phone]  Fax: [phone] Aimee Murrieta DO        2022     [address]  [address]  [facility] [address]      Dear Dennis Plascencia: We missed seeing you for a scheduled appointment at [address] with Lazarus Loots, DO on 3/21/2022. We're sorry you were unable to keep your appointment and hope that you are doing well. We ask that you please call 24 hours in advance if you are unable to make your appointment, so that we can give that time to another patient in need. We care about you and the management of your healthcare and want to make sure that you follow up as recommended. To provide quality care and timely appointments to all our patients, you may be dismissed from the practice if you do not show for three (3) scheduled appointments within a 12-month period. We would like to continue treating your healthcare needs. Please call the office to reschedule your appointment, if needed.      Sincerely,        Lazarus Loots, DO

## 2022-04-06 ENCOUNTER — TELEPHONE (OUTPATIENT)
Dept: PRIMARY CARE CLINIC | Age: 52
End: 2022-04-06

## 2022-04-06 NOTE — TELEPHONE ENCOUNTER
Patient states she was tested for VIT D and wasn't sure if she should continue taking the VIT D. Writer saw her labs were drawn on 11/29/21, The notes from PCP states that patient is to take VIT D once weekly x 3 months. and the VIT D was prescribed on 12/2/21. The script was sent for 12 capsules, with 1 refill. Patient has not been taking the medication consistently as she has 2 capsules left.  She wants to know if she should take it for another 3 months since the refill is there, or if she should re-check her VIT D level    Please advise

## 2022-04-14 ENCOUNTER — TELEPHONE (OUTPATIENT)
Dept: NEUROLOGY | Age: 52
End: 2022-04-14

## 2022-04-14 DIAGNOSIS — R41.3 MEMORY LOSS: ICD-10-CM

## 2022-04-14 DIAGNOSIS — G43.709 CHRONIC MIGRAINE WITHOUT AURA WITHOUT STATUS MIGRAINOSUS, NOT INTRACTABLE: ICD-10-CM

## 2022-04-14 NOTE — TELEPHONE ENCOUNTER
Gordo Griffin called the office today to see if we received the MRI brain results from [facility] an the results. I let her know that we had just received them a few hours ago and they were in the doctors basket to review. Gordo Griffin stated that she would be leaving very early in the morning to go out of town. Patient stated that she is very anxious and hoped that she could get the results before she left. I told her I would look in to this and let her know. This was discussed with Dr. Brayden Pan, who asked that I let the patient know the MRI brain results were normal on the report. She will have the films sent over for her review as well. Call placed back to the patient and this information was given. Gaviotabakari OlmedoGaby verbally stated her understanding. Will call [facility] radiology dept tomorrow during business hours to have them push the films.

## 2022-06-28 ENCOUNTER — TELEPHONE (OUTPATIENT)
Dept: PRIMARY CARE CLINIC | Age: 52
End: 2022-06-28

## 2022-06-28 DIAGNOSIS — U07.1 COVID-19: Primary | ICD-10-CM

## 2022-06-28 NOTE — TELEPHONE ENCOUNTER
Patient's  calling in office states that patient is covid positive as of this morning. Patient states her head is congested, having trouble swallowing, temp is 102. Wondering if she is able to take paxlovid.

## 2022-06-28 NOTE — TELEPHONE ENCOUNTER
Please let pt know I sent in paxlovid. Recommend taking tylenol cold or claritin D  to help with congestion over the counter. If having any shortness of breath or unable to swallow would recommend being evaluated in ER.

## 2022-06-30 ENCOUNTER — NURSE TRIAGE (OUTPATIENT)
Dept: OTHER | Facility: CLINIC | Age: 52
End: 2022-06-30

## 2022-06-30 ENCOUNTER — TELEPHONE (OUTPATIENT)
Dept: PRIMARY CARE CLINIC | Age: 52
End: 2022-06-30

## 2022-06-30 NOTE — TELEPHONE ENCOUNTER
Subjective: Caller states \"COVID test positive with sore throat\"     Current Symptoms: Sore throat and cough, started Paxlovid    Onset: 2 days ago; gradual    Associated Symptoms: NA    Pain Severity: 0/10; N/A; none    Temperature: 102, yesterday, 100.2 today    What has been tried: Paxlovid and tylenol    Recommended disposition: Consuelo Williamson 4696 advice provided, patient verbalizes understanding; denies any other questions or concerns; instructed to call back for any new or worsening symptoms. Pt agrees to monitor at home and call back with worsening symptoms or concerns. This triage is a result of a call to [address] [place] [address]. Please do not respond to the triage nurse through this encounter. Any subsequent communication should be directly with the patient.     Reason for Disposition   [1] COVID-19 diagnosed by positive lab test (e.g., PCR, rapid self-test kit) AND [2] mild symptoms (e.g., cough, fever, others) AND [1] no complications or SOB    Protocols used: CORONAVIRUS (COVID-19) DIAGNOSED OR SUSPECTED-ADULT-

## 2022-06-30 NOTE — TELEPHONE ENCOUNTER
Patient states that she normally has dry eyes/sinus allergies but this was on its own. No headache was present. She is managing symptoms with OTC medication. She is asking \"if I discontinue the medication and it happens again, can I restart the Paxlovid since it happened without the medication? \"

## 2022-06-30 NOTE — TELEPHONE ENCOUNTER
I would recommend to just discontinue. If continues to having blurred vision recommend getting vision checked.

## 2022-06-30 NOTE — TELEPHONE ENCOUNTER
Patient called in stating that she started the paxlovid Tuesday around 5pm. She states that yesterday afternoon she had an episode of blurry vision. She states she initially thought it was related to her glasses because she needs another eye exam anyway, but then it went away after 15-20 minutes. At about 11:30pm last night, she had another episode of blurry vision and it was worse than before where she was unable to recognize the images on her television screen and was also unable to read the medication bottle in front of her. She had to take a picture of the bottle and enlarge it with her phone. By the time she went to call the nurse's line, the episode had ended, lasting 15-20 minutes again    Patient researched if this is an issue with COVID or the medication but is unable to find anything and is wondering if she should continue taking the medication or discontinue it.  She has not taken it this morning

## 2022-07-14 ENCOUNTER — TELEPHONE (OUTPATIENT)
Dept: PRIMARY CARE CLINIC | Age: 52
End: 2022-07-14

## 2022-07-14 DIAGNOSIS — E55.9 VITAMIN D DEFICIENCY: Primary | ICD-10-CM

## 2022-07-14 NOTE — TELEPHONE ENCOUNTER
Pt called back stating she was supposed to speak with Karis and f/u with side effects of paxlovid. Attempted to help pt but pt wanted to speak to Nitza Talavera. Advised that Nitza Talavera was out of office for the day and will return in the morning. Also advised message will be sent to her.  Pt verbalized understanding    Please contact pt to f/u

## 2022-07-14 NOTE — TELEPHONE ENCOUNTER
Writer already notified patient of PCP's advise from 6/30/22.  Writer contacted patient per her request. LVM asking patient to return call to office

## 2022-07-14 NOTE — TELEPHONE ENCOUNTER
----- Message from Tee Hensley sent at 7/14/2022  2:05 PM EDT -----  Subject: Message to Provider    QUESTIONS  Information for Provider? [facility]. Pt   stated she was waiting for a phone call back from 06/30. It is in regards   to medication side effects. Please advise.   ---------------------------------------------------------------------------  --------------  Deandra [facility]  [phone]; OK to leave message on voicemail, OK to respond with   electronic message via [employer] Shared [employer] portal (only for patients who have   registered The Shared [employer] account)  ---------------------------------------------------------------------------  --------------  SCRIPT ANSWERS  Relationship to Patient?  Self

## 2022-07-14 NOTE — TELEPHONE ENCOUNTER
----- Message from Kathianereida Alan sent at 7/14/2022  2:01 PM EDT -----  Subject: Referral Request    Reason for referral request? Pt needs vitamin D levels checked. Pt stated   she needs blood work completed to see what her levels are and what is   needed. Please advise pt. Provider patient wants to be referred to(if known):     Provider Phone Number(if known): Additional Information for Provider?  Pt would like to get blood work done   at [facility] on [address] [facility], [place], [address]  ---------------------------------------------------------------------------  --------------  [id number]    [phone]; OK to leave message on voicemail  ---------------------------------------------------------------------------  --------------

## 2022-07-15 LAB
VITAMIN D 25-HYDROXY: 40
VITAMIN D2, 25 HYDROXY: NORMAL
VITAMIN D3,25 HYDROXY: NORMAL

## 2022-07-15 NOTE — TELEPHONE ENCOUNTER
Writer spoke with patient. Patient states she does remember speaking to me regarding the blurred vision. Patient states she was supposed to quarantine from covid until 7/3/22 but she re-tested for covid on 7/2/22 and it was negative. On Sunday night 7/3/22 she was feeling congested again and it was not being relieved by sudafed or benadryl. She tested for covid again Monday 7/4/22 and it came back positive. She had no sense of smell and her taste was affected. She feels completely \"wiped out\" and feels \"shaky\" after exerting herself. She is wondering the following:    Could this be related to paxlovid? Is this a second round of covid? If so, how long should she quarantine and wear a mask in public? Should she be concerned about any cardiac issues due to the shakiness?      Writer informed patient that Dr Pedro Frederick is not in the office today but a message would be routed to PCP, writer also advised patient to call [facility] for information about quarantining and reported covid since patient was concerned, and writer also provided walk in clinic office hours and information and assured her we would call her back when Dr Pedro Frederick responds

## 2022-07-15 NOTE — TELEPHONE ENCOUNTER
Per Dr Wes Gray Del have been some people who feel sick slightly again (rebound)  after improving with paxlovid. I recommend she reisolate for five days starting when she felt sick again with the new positive test. If It improves after five days can come out of isolation and can wear mask an extra five days after when in public. I would like for her to come in to be seen can be walk in if no available providers since she is feeling worse to be examined. Treatment now is mainly just symptomatic so recommend Tylenol/ ibuprofen for any aches or pains.  \"    Patient notified and verbalized understanding

## 2022-07-19 ENCOUNTER — TELEPHONE (OUTPATIENT)
Dept: PRIMARY CARE CLINIC | Age: 52
End: 2022-07-19

## 2022-07-19 DIAGNOSIS — E55.9 VITAMIN D DEFICIENCY: ICD-10-CM

## 2022-07-19 DIAGNOSIS — G56.03 BILATERAL CARPAL TUNNEL SYNDROME: ICD-10-CM

## 2022-07-19 NOTE — TELEPHONE ENCOUNTER
Pt called into the office asking if we have results from April for an EMG. Writer reviewed chart and reviewed scanned progress note in media. Findings are reported under progress notes by dr. Callahan Credit. Writer scanned into pcp for review. Pt asked if results can be reviewed w/ her  Advised that message will be sent to providers for review to result EMG. Please advise, pt has been waiting 3 months for results and is concerned.

## 2022-07-20 ENCOUNTER — TELEPHONE (OUTPATIENT)
Dept: NEUROLOGY | Age: 52
End: 2022-07-20

## 2022-07-20 DIAGNOSIS — R41.3 MEMORY LOSS: ICD-10-CM

## 2022-07-20 NOTE — TELEPHONE ENCOUNTER
Fernanda's EMG is available for you to review. Late entry: Patient called in yesterday for results of which were unavailable. Request sent to [facility].

## 2022-07-20 NOTE — TELEPHONE ENCOUNTER
Please let her know findings are consistent with bilateral carpal tunnel (median nerve) so I recommend wearing wrist splints and can take NSAIDS. Does she ever take any medication for it? I can send celebrex to take with food that can help . I also recommend seeing orthopedics for it for further management if not improving - I can make a referral if she is ok with this. Thanks!

## 2022-07-28 ENCOUNTER — TELEPHONE (OUTPATIENT)
Dept: PRIMARY CARE CLINIC | Age: 52
End: 2022-07-28

## 2022-07-28 DIAGNOSIS — G56.03 BILATERAL CARPAL TUNNEL SYNDROME: Primary | ICD-10-CM

## 2022-07-29 NOTE — TELEPHONE ENCOUNTER
Spoke with INGRID Elder and she faxed over the script for the wrist braces. Anna Rasmussen also informed the patient that the script was faxed to [facility].

## 2022-08-01 ENCOUNTER — TELEPHONE (OUTPATIENT)
Dept: PRIMARY CARE CLINIC | Age: 52
End: 2022-08-01

## 2022-08-01 DIAGNOSIS — G89.29 CHRONIC PAIN OF RIGHT KNEE: Primary | ICD-10-CM

## 2022-08-01 DIAGNOSIS — M25.561 CHRONIC PAIN OF RIGHT KNEE: Primary | ICD-10-CM

## 2022-08-01 RX ORDER — MAGNESIUM OXIDE 400 MG/1
TABLET ORAL
Qty: 30 TABLET | Refills: 0 | Status: SHIPPED | OUTPATIENT
Start: 2022-08-01

## 2022-08-01 NOTE — TELEPHONE ENCOUNTER
Pharmacy requesting refill of Magnesium oxide 400 mg. Medication active on med list yes      Date of last Rx: 3/9/22 with 3 refills          verified by INGRID CERON      Date of last appointment 3/9/22    Next Visit Date: None, message left to schedule follow up appointment.

## 2022-08-01 NOTE — TELEPHONE ENCOUNTER
Patient came into office, she asked if you would be able to place on order for a right knee brace. Can be faxed to [facility] at fax [phone].

## 2022-08-31 ENCOUNTER — HOSPITAL ENCOUNTER (OUTPATIENT)
Dept: WOMENS IMAGING | Age: 52
Discharge: HOME | End: 2022-09-02
Payer: COMMERCIAL

## 2022-08-31 DIAGNOSIS — R22.31 LUMP OF AXILLA, RIGHT: ICD-10-CM

## 2022-08-31 DIAGNOSIS — N95.0 POSTMENOPAUSAL BLEEDING: ICD-10-CM

## 2022-08-31 PROCEDURE — 76830 TRANSVAGINAL US NON-OB: CPT

## 2022-08-31 PROCEDURE — 76882 US LMTD JT/FCL EVL NVASC XTR: CPT

## 2022-08-31 PROCEDURE — G0279 TOMOSYNTHESIS, MAMMO: HCPCS

## 2022-12-22 ENCOUNTER — OFFICE VISIT (OUTPATIENT)
Dept: FAMILY MEDICINE CLINIC | Age: 52
End: 2022-12-22
Payer: COMMERCIAL

## 2022-12-22 VITALS
HEART RATE: 64 BPM | DIASTOLIC BLOOD PRESSURE: 62 MMHG | WEIGHT: 159 LBS | OXYGEN SATURATION: 98 % | SYSTOLIC BLOOD PRESSURE: 126 MMHG | HEIGHT: 61 IN | TEMPERATURE: 97.3 F | BODY MASS INDEX: 30.02 KG/M2

## 2022-12-22 DIAGNOSIS — L30.9 DERMATITIS: Primary | ICD-10-CM

## 2022-12-22 DIAGNOSIS — M25.511 ACUTE PAIN OF RIGHT SHOULDER: ICD-10-CM

## 2022-12-22 PROCEDURE — G8484 FLU IMMUNIZE NO ADMIN: HCPCS | Performed by: NURSE PRACTITIONER

## 2022-12-22 PROCEDURE — G8427 DOCREV CUR MEDS BY ELIG CLIN: HCPCS | Performed by: NURSE PRACTITIONER

## 2022-12-22 PROCEDURE — 99214 OFFICE O/P EST MOD 30 MIN: CPT | Performed by: NURSE PRACTITIONER

## 2022-12-22 PROCEDURE — 1036F TOBACCO NON-USER: CPT | Performed by: NURSE PRACTITIONER

## 2022-12-22 PROCEDURE — G8417 CALC BMI ABV UP PARAM F/U: HCPCS | Performed by: NURSE PRACTITIONER

## 2022-12-22 PROCEDURE — 3017F COLORECTAL CA SCREEN DOC REV: CPT | Performed by: NURSE PRACTITIONER

## 2022-12-22 RX ORDER — LIDOCAINE 50 MG/G
1 PATCH TOPICAL DAILY
Qty: 30 PATCH | Refills: 0 | Status: SHIPPED | OUTPATIENT
Start: 2022-12-22 | End: 2023-01-21

## 2022-12-22 RX ORDER — TRIAMCINOLONE ACETONIDE 1 MG/G
CREAM TOPICAL
Qty: 30 G | Refills: 0 | Status: SHIPPED | OUTPATIENT
Start: 2022-12-22

## 2022-12-22 RX ORDER — DULOXETIN HYDROCHLORIDE 30 MG/1
30 CAPSULE, DELAYED RELEASE ORAL DAILY
COMMUNITY

## 2022-12-22 ASSESSMENT — PATIENT HEALTH QUESTIONNAIRE - PHQ9
2. FEELING DOWN, DEPRESSED OR HOPELESS: 0
SUM OF ALL RESPONSES TO PHQ QUESTIONS 1-9: 0
SUM OF ALL RESPONSES TO PHQ QUESTIONS 1-9: 0
1. LITTLE INTEREST OR PLEASURE IN DOING THINGS: 0
SUM OF ALL RESPONSES TO PHQ QUESTIONS 1-9: 0
SUM OF ALL RESPONSES TO PHQ9 QUESTIONS 1 & 2: 0
SUM OF ALL RESPONSES TO PHQ QUESTIONS 1-9: 0

## 2022-12-22 ASSESSMENT — ENCOUNTER SYMPTOMS
VOMITING: 0
SORE THROAT: 0
CHEST TIGHTNESS: 0
EYE PAIN: 0
COUGH: 0
RHINORRHEA: 0
SHORTNESS OF BREATH: 0
NAUSEA: 0

## 2022-12-22 NOTE — PROGRESS NOTES
[address] [facility]  [address] [place] [facility] [address]  [address] [address]  Dept: [phone]  Dept Fax: [phone]    Bri Germain is a 47 y.o. female who presents today for her medical conditions/complaints of   Chief Complaint   Patient presents with    Rash     Left arm- itchy    Shoulder Pain     Right shoulder discomfort          HPI:     /62   Pulse 64   Temp 97.3 °F (36.3 °C) (Temporal)   Ht 5' 1\" (1.549 m)   Wt 159 lb (72.1 kg)   LMP 2018   SpO2 98%   BMI 30.04 kg/m²       HPI  The patient presented to the clinic today with complaint of rash for 3 days. Rash is located on her left upper and right upper arm and is not spreading. It is exacerbated by touch, and alleviated by nothing. There are associated signs and symptoms of itching. There is no fever, chills, drainage, nausea, vomiting, SOB, wheezing or pain. Current treatment includes Aveeno soothing lotion with little improvement. Pt has had this happen in the past. Unsure of diagnosis. Resolved. Pt also with c/o right shoulder pain. This is a new problem. The current episode started 2 weeks ago. The problem has been waxing and waning since onset. Pain is a deep ache. Associated symptoms include: numbness/tingling in the right arm (also has carpel tunnel which could be contributing to this), decreased ROM. Pertinent negatives include: No fever, weakness, bony tenderness, injury . Pt has tried nothing with no improvement. She does sleep on her right arm and notices pain seems worse when waking in the AM. Finds it more difficult to raise her arm to apply deodorant. No previous injury or shoulder surgery. History of fibromyalgia, migraine headaches and carpel tunnel. Following with neurology.      Past Medical History:   Diagnosis Date    Acid reflux     Anemia     Dyslipidemia     Insomnia     Migraine     Tremor         Past Surgical History: Procedure Laterality Date    CERVICAL POLYP REMOVAL  11/2017    DILATION AND CURETTAGE OF UTERUS  11/29/2017       Family History   Problem Relation Age of Onset    Breast Cancer Maternal Grandmother     Cancer Maternal Grandmother         breast    Hypertension Mother     Tremors Mother     Heart Disease Maternal Grandfather     Ovarian Cancer Paternal Grandmother     Kidney Cancer Paternal Grandfather        Social History     Tobacco Use    Smoking status: Never    Smokeless tobacco: Never   Substance Use Topics    Alcohol use: Yes        Prior to Visit Medications    Medication Sig Taking? Authorizing Provider   triamcinolone (KENALOG) 0.1 % cream Apply topically 2 times daily, until rash gone Yes KUN Rodarte CNP   DULoxetine (CYMBALTA) 30 MG extended release capsule Take 30 mg by mouth daily Yes Historical Provider, MD   lidocaine (LIDODERM) 5 % Place 1 patch onto the skin daily 12 hours on, 12 hours off.  Yes KUN Rodarte CNP   magnesium oxide (MAG-OX) 400 MG tablet TAKE 1 TABLET BY MOUTH EVERY DAY  Demetria Martínez MD   Elastic Bandages & Supports (WRIST SPLINT) MISC BL wrist splint for carpal tunnel, wear daily  Janna Medhkour, DO   pantoprazole (PROTONIX) 40 MG tablet Take 40 mg by mouth daily  Historical Provider, MD   magnesium oxide (MAG-OX) 400 (240 Mg) MG tablet Take 1 tablet by mouth daily  Demetria Martínez MD   vitamin D (ERGOCALCIFEROL) 1.25 MG (46203 UT) CAPS capsule Take 1 capsule by mouth once a week  Janna Medhkour, DO   SUMAtriptan (IMITREX) 50 MG tablet Take 1 tablet by mouth once as needed for Migraine  Janna Medhkour, DO   pantoprazole (PROTONIX) 40 MG tablet Take 1 tablet by mouth daily  Redia Low, DO   butalbital-acetaminophen-caffeine (FIORICET, ESGIC) -40 MG per tablet Take 1 tablet by mouth every 4 hours as needed for Headaches  Ronda Lama MD   fluticasone (FLONASE) 50 MCG/ACT nasal spray 1 spray by Nasal route daily  Historical Provider, MD No Known Allergies      Subjective:      Review of Systems   Constitutional:  Negative for chills and fever. HENT:  Negative for congestion, ear pain, rhinorrhea and sore throat. Eyes:  Negative for pain and visual disturbance. Respiratory:  Negative for cough, chest tightness and shortness of breath. Cardiovascular:  Negative for chest pain, palpitations and leg swelling. Gastrointestinal:  Negative for nausea and vomiting. Genitourinary:  Negative for decreased urine volume and difficulty urinating. Musculoskeletal:  Positive for arthralgias and myalgias. Negative for gait problem and neck pain. Skin:  Positive for rash. Neurological:  Positive for numbness. Negative for weakness, light-headedness and headaches. Psychiatric/Behavioral:  Negative for sleep disturbance. Objective:     Physical Exam  Vitals and nursing note reviewed. Constitutional:       General: She is not in acute distress. Appearance: Normal appearance. HENT:      Head: Normocephalic and atraumatic. Right Ear: Tympanic membrane and ear canal normal.      Left Ear: Tympanic membrane and ear canal normal.      Nose: Nose normal.      Mouth/Throat:      Lips: Pink. Mouth: Mucous membranes are moist.      Pharynx: Oropharynx is clear. Uvula midline. Eyes:      Extraocular Movements: Extraocular movements intact. Conjunctiva/sclera: Conjunctivae normal.   Cardiovascular:      Rate and Rhythm: Normal rate and regular rhythm. Pulses: Normal pulses. Pulmonary:      Effort: Pulmonary effort is normal.      Breath sounds: Normal breath sounds. Abdominal:      General: Bowel sounds are normal.      Palpations: Abdomen is soft. Musculoskeletal:      Right shoulder: Tenderness present. No swelling, deformity or bony tenderness. Decreased range of motion. Normal strength. Normal pulse. Arms:       Cervical back: Normal range of motion and neck supple.       Comments: Tenderness to firm palpation. Decreased abduction. Neg empty can test.    Skin:     General: Skin is warm and dry. Capillary Refill: Capillary refill takes less than 2 seconds. Findings: Rash present. Comments: Bilateral upper right and left arm- see images uploaded into media file. Neurological:      Mental Status: She is alert and oriented to person, place, and time. Coordination: Coordination normal.      Gait: Gait normal.   Psychiatric:         Mood and Affect: Mood normal.         Thought Content: Thought content normal.         MEDICAL DECISION MAKING Assessment/Plan:     Tri Kitchen was seen today for rash and shoulder pain. Diagnoses and all orders for this visit:    Dermatitis  -     triamcinolone (KENALOG) 0.1 % cream; Apply topically 2 times daily, until rash gone    Acute pain of right shoulder  -     XR SHOULDER RIGHT (MIN 2 VIEWS); Future  -     lidocaine (LIDODERM) 5 %; Place 1 patch onto the skin daily 12 hours on, 12 hours off. Results for orders placed or performed in visit on 07/19/22   Vitamin D 25 Hydroxy   Result Value Ref Range    Vit D, 25-Hydroxy 40     Vitamin D3,25 Hydroxy      Vitamin D2, 25 Hydroxy       Rash- based on the appearance of the rash will treat as dermatitis with Kenalog cream as prescribed. Pt concerned for shingles however the rash is not blistered and is present on bilateral upper extremities. Right shoulder pain- no known injury. I suspect this is from her sleeping position and strain on the shoulder. Will check xray to rule out anything acute. Lidoderm patch as prescribed. May take Motrin/Tylenol as directed on the bottle for pain. Pt to return if symptoms are not improving or worsening. Go to the ER for any emergent concern. Patient given educational materials - see patientinstructions. Discussed use, benefit, and side effects of prescribed medications. All patient questions answered. Pt verbalized understanding.   Instructed to continue current medications, diet and exercise. Patient agreed with treatment plan. Follow up as directed.      Electronically signed by KUN Sepulveda CNP on 12/22/2022 at 5:44 PM

## 2023-01-23 NOTE — TELEPHONE ENCOUNTER
Patient would like a refill sent to pharmacy she stopped taking for about 2 weeks after reading about side effects from being on the medication long term. She wants to know how long she can be on the medication long term. 98.3

## 2023-04-17 ENCOUNTER — TELEPHONE (OUTPATIENT)
Dept: PRIMARY CARE CLINIC | Age: 53
End: 2023-04-17

## 2023-04-17 NOTE — TELEPHONE ENCOUNTER
PT called and said she was seen before for a bunch of issues but one issue being a lump on the back of her leg. Robbie Aldanas its gotten larger. Pt last appt was December of 2021 so advised Pt to make an appt. Dr. Shalini Florence booked until end of June. Advised Pt she can come to walk in if she'd like but also recommend to make appt with Dr. Shalini Florence because after 3 years she would be considered a new pt.  Pt said ok

## 2023-04-21 ENCOUNTER — OFFICE VISIT (OUTPATIENT)
Dept: FAMILY MEDICINE CLINIC | Age: 53
End: 2023-04-21

## 2023-04-21 VITALS
DIASTOLIC BLOOD PRESSURE: 82 MMHG | HEART RATE: 84 BPM | HEIGHT: 61 IN | WEIGHT: 165 LBS | OXYGEN SATURATION: 99 % | BODY MASS INDEX: 31.15 KG/M2 | RESPIRATION RATE: 14 BRPM | SYSTOLIC BLOOD PRESSURE: 120 MMHG

## 2023-04-21 DIAGNOSIS — G89.29 CHRONIC BILATERAL LOW BACK PAIN WITHOUT SCIATICA: Primary | ICD-10-CM

## 2023-04-21 DIAGNOSIS — R22.41 MASS OF LOWER EXTREMITY, RIGHT: ICD-10-CM

## 2023-04-21 DIAGNOSIS — M54.50 CHRONIC BILATERAL LOW BACK PAIN WITHOUT SCIATICA: Primary | ICD-10-CM

## 2023-04-21 LAB
BILIRUBIN, POC: NEGATIVE
BLOOD URINE, POC: NEGATIVE
CLARITY, POC: CLEAR
COLOR, POC: YELLOW
GLUCOSE URINE, POC: NEGATIVE
KETONES, POC: NEGATIVE
LEUKOCYTE EST, POC: NEGATIVE
NITRITE, POC: NEGATIVE
PH, POC: 6
PROTEIN, POC: NEGATIVE
SPECIFIC GRAVITY, POC: >1.03
UROBILINOGEN, POC: 0.2

## 2023-04-21 ASSESSMENT — ENCOUNTER SYMPTOMS
EYES NEGATIVE: 1
BACK PAIN: 1
COLOR CHANGE: 0
BOWEL INCONTINENCE: 0
SHORTNESS OF BREATH: 0
WHEEZING: 0
GASTROINTESTINAL NEGATIVE: 1

## 2023-04-21 NOTE — PROGRESS NOTES
[address] [facility]  [address] [place] [facility] [address]  [address] [address]  Dept: [phone]  Dept Fax: [phone]    Kandy Romero is a 47 y.o. female who presents today for her medical conditions/complaints of   Chief Complaint   Patient presents with    Flank Pain     Start date: intermediate for a year   Bilateral flank pain , incomplete void       Mass     Right leg   Sx: seems to be getting larger           HPI:     /82 (Site: Right Upper Arm, Position: Sitting, Cuff Size: Large Adult)   Pulse 84   Resp 14   Ht 5' 1\" (1.549 m)   Wt 165 lb (74.8 kg)   LMP 2018   SpO2 99%   BMI 31.18 kg/m²     Patient presents for a lump that has been present over 10 years. She is concerned the area is growing slowly over time. It is non-painful. No skin color drainage. Mass  This is a chronic (Posterior thigh) problem. Episode onset: 8-10 years. The problem occurs constantly. The problem has been unchanged. Pertinent negatives include no chest pain, chills, fever, myalgias or rash. Back Pain  This is a new problem. The current episode started more than 1 year ago. The problem occurs intermittently. The pain is present in the lumbar spine. The pain is mild. The symptoms are aggravated by position and bending. Pertinent negatives include no bladder incontinence, bowel incontinence, chest pain, dysuria, fever, leg pain, perianal numbness or tingling. She has tried chiropractic manipulation for the symptoms. No known trauma to the back.      Past Medical History:   Diagnosis Date    Acid reflux     Anemia     Dyslipidemia     Insomnia     Migraine     Tremor         Past Surgical History:   Procedure Laterality Date    CERVICAL POLYP REMOVAL  2017    DILATION AND CURETTAGE OF UTERUS  2017       Family History   Problem Relation Age of Onset    Breast Cancer Maternal Grandmother     Cancer Maternal Grandmother         breast

## 2023-10-18 ENCOUNTER — HOSPITAL ENCOUNTER (OUTPATIENT)
Dept: WOMENS IMAGING | Age: 53
Discharge: HOME | End: 2023-10-20
Payer: COMMERCIAL

## 2023-10-18 DIAGNOSIS — Z12.31 ENCOUNTER FOR SCREENING MAMMOGRAM FOR MALIGNANT NEOPLASM OF BREAST: ICD-10-CM

## 2023-10-18 PROCEDURE — 77063 BREAST TOMOSYNTHESIS BI: CPT

## 2024-08-20 ENCOUNTER — HOSPITAL ENCOUNTER (OUTPATIENT)
Dept: WOMENS IMAGING | Age: 54
Discharge: HOME | End: 2024-08-22
Payer: COMMERCIAL

## 2024-08-20 ENCOUNTER — HOSPITAL ENCOUNTER (OUTPATIENT)
Dept: WOMENS IMAGING | Age: 54
End: 2024-08-20
Payer: COMMERCIAL

## 2024-08-20 VITALS — WEIGHT: 165 LBS | BODY MASS INDEX: 31.15 KG/M2 | HEIGHT: 61 IN

## 2024-08-20 DIAGNOSIS — N64.53 RETRACTION OF NIPPLE: ICD-10-CM

## 2024-08-20 PROCEDURE — G0279 TOMOSYNTHESIS, MAMMO: HCPCS

## 2025-01-15 ENCOUNTER — HOSPITAL ENCOUNTER (OUTPATIENT)
Dept: WOMENS IMAGING | Age: 55
Discharge: HOME | End: 2025-01-17
Payer: COMMERCIAL

## 2025-01-15 DIAGNOSIS — Z12.31 ENCOUNTER FOR SCREENING MAMMOGRAM FOR MALIGNANT NEOPLASM OF BREAST: ICD-10-CM

## 2025-01-15 PROCEDURE — 77063 BREAST TOMOSYNTHESIS BI: CPT

## 2025-03-19 DIAGNOSIS — Z20.828 EXPOSURE TO INFLUENZA: Primary | ICD-10-CM

## 2025-03-19 RX ORDER — OSELTAMIVIR PHOSPHATE 75 MG/1
75 CAPSULE ORAL 2 TIMES DAILY
Qty: 20 CAPSULE | Refills: 0 | Status: SHIPPED | OUTPATIENT
Start: 2025-03-19 | End: 2025-03-29